# Patient Record
Sex: FEMALE | Race: WHITE | NOT HISPANIC OR LATINO | Employment: PART TIME | URBAN - METROPOLITAN AREA
[De-identification: names, ages, dates, MRNs, and addresses within clinical notes are randomized per-mention and may not be internally consistent; named-entity substitution may affect disease eponyms.]

---

## 2017-01-30 ENCOUNTER — APPOINTMENT (OUTPATIENT)
Dept: OCCUPATIONAL MEDICINE | Facility: CLINIC | Age: 22
End: 2017-01-30

## 2017-01-30 PROCEDURE — 99000 SPECIMEN HANDLING OFFICE-LAB: CPT | Performed by: PHYSICIAN ASSISTANT

## 2017-05-26 ENCOUNTER — CARE COORDINATION (OUTPATIENT)
Dept: CARE COORDINATION | Facility: CLINIC | Age: 22
End: 2017-05-26

## 2017-05-26 NOTE — PROGRESS NOTES
Clinic Care Coordination Contact    Situation: Patient chart reviewed by care coordinator.    Background: [facility] placed call to each phone number in pt's chart in an attempt to do a proactive IHP outreach.  None of the phone numbers allowed for this writer to leave a message.  SW to send letter of introduction to pt in today's mail.    Assessment: SW able to determine via chart review that pt had 2 ED visits due to cellulitis and 1 admission due to birth of child in the past year.    Plan/Recommendations: SW to try pt's phone numbers again next week.    Koki España  Social Work Care Coordinator  [facility]  [phone]

## 2017-05-26 NOTE — LETTER
[place] CARE COORDINATION  [address]  [place] [address]  [phone]      May 26, 2017      Nina Montes  [address]  [facility] [address]    Dear iNna,  I am the Clinic Care Coordinator that works with your primary care provider's clinic. I wanted to introduce myself and provide you with my contact information for you to be able to call me with any questions or concerns. Below is a description of what Clinic Care Coordination is and how I can further assist you.     The Clinic Care Coordinator role is a Registered Nurse and/or  who understands the health care system. The goal of Clinic Care Coordination is to help you manage your health and improve access to the [facility] system in the most efficient manner.  The Registered Nurse can assist you in meeting your health care goals by providing education, coordinating services, and strengthening the communication among your providers. The  can assist you with financial, behavioral, psychosocial, and chemical dependency and counseling/psychiatric resources.    Please feel free to keep this letter and contact me at [phone] with any further questions or concerns that may arise. We at [facility] are focused on providing you with the highest-quality healthcare experience possible and that all starts with you.       Sincerely,     Koki Cao

## 2017-05-26 NOTE — LETTER
Health Care Home - Access Care Plan    About Me  Patient Name:  Nina Montes    YOB: 1995  Age:                            21 year old   Geoff MRN:         [id number] Telephone Information:     Home Phone [phone]   Mobile [phone]       Address:    [address] [address] Email address:  [email or web address]      Emergency Contact(s)  Name Relationship Lgl Grd Work Phone Home Phone Mobile Phone   1. LIAM MONTES Mother   [phone]    2. DOROTHEA ALVES Other   [phone]              My Access Plan  Medical Emergency 916   Questions or concerns during clinic hours Primary Clinic Line, I will call the clinic directly:  [phone]   24 Hour Appointment Line [phone] or  [phone])  (toll free)   24 Hour Nurse Line [phone] (toll free)   Questions or concerns outside clinic hours 24 Hour Appointment Line, I will call the after-hours on-call line: [facility] [phone] or [phone]) (toll-free)   Preferred Urgent Care  [phone]   Preferred Hospital  [phone]   Preferred Pharmacy [facility] [address]     Behavioral Health Crisis Line Crisis Connection, [phone] or 911     My Care Team Members  Patient Care Team       Relationship Specialty Notifications Start End    Shamika Matias APRN CNP PCP - General Nurse Practitioner - Adult Health  4/22/15     Phone: [phone] Fax: [phone]         [facility] [address] [place] [address]    Koki Cao   Admissions 5/26/17     Phone: [phone] Fax: [phone]            My Medical and Care Information  Problem List   Patient Active Problem List   Diagnosis     Tobacco abuse     Reactive airway disease without complication     Nexplanon      Current Medications and Allergies:  See printed Medication Report

## 2017-06-01 ENCOUNTER — CARE COORDINATION (OUTPATIENT)
Dept: CARE COORDINATION | Facility: CLINIC | Age: 22
End: 2017-06-01

## 2017-06-01 NOTE — PROGRESS NOTES
Clinic Care Coordination Contact  [facility]/Voicemail    Referral Source: Pro-Active Outreach  Clinical Data: Care Coordinator Outreach  Outreach attempted x 2.  Left message on voicemail with call back information and requested return call.  Plan: Care Coordinator mailed out care coordination introduction letter on 5/26/17. Care Coordinator will do no further outreaches at this time.    Koki España  Social Work Care Coordinator  [facility]  [phone]

## 2017-09-27 ENCOUNTER — OFFICE VISIT (OUTPATIENT)
Dept: FAMILY MEDICINE | Facility: CLINIC | Age: 22
End: 2017-09-27
Payer: MEDICAID

## 2017-09-27 VITALS
HEART RATE: 140 BPM | BODY MASS INDEX: 32.95 KG/M2 | HEIGHT: 66 IN | DIASTOLIC BLOOD PRESSURE: 80 MMHG | WEIGHT: 205 LBS | OXYGEN SATURATION: 98 % | TEMPERATURE: 103.3 F | SYSTOLIC BLOOD PRESSURE: 149 MMHG

## 2017-09-27 DIAGNOSIS — R30.0 DYSURIA: ICD-10-CM

## 2017-09-27 DIAGNOSIS — N10 ACUTE PYELONEPHRITIS: Primary | ICD-10-CM

## 2017-09-27 DIAGNOSIS — R82.90 NONSPECIFIC FINDING ON EXAMINATION OF URINE: ICD-10-CM

## 2017-09-27 LAB
ALBUMIN UR-MCNC: NEGATIVE MG/DL
APPEARANCE UR: CLEAR
BACTERIA #/AREA URNS HPF: ABNORMAL /HPF
BILIRUB UR QL STRIP: NEGATIVE
COLOR UR AUTO: YELLOW
GLUCOSE UR STRIP-MCNC: NEGATIVE MG/DL
HGB UR QL STRIP: ABNORMAL
KETONES UR STRIP-MCNC: 15 MG/DL
LEUKOCYTE ESTERASE UR QL STRIP: ABNORMAL
NITRATE UR QL: NEGATIVE
PH UR STRIP: 5 PH (ref 5–7)
RBC #/AREA URNS AUTO: ABNORMAL /HPF
SOURCE: ABNORMAL
SP GR UR STRIP: <=1.005 (ref 1–1.03)
UROBILINOGEN UR STRIP-ACNC: 0.2 EU/DL (ref 0.2–1)
WBC #/AREA URNS AUTO: ABNORMAL /HPF

## 2017-09-27 PROCEDURE — 87088 URINE BACTERIA CULTURE: CPT | Performed by: INTERNAL MEDICINE

## 2017-09-27 PROCEDURE — 81001 URINALYSIS AUTO W/SCOPE: CPT | Performed by: INTERNAL MEDICINE

## 2017-09-27 PROCEDURE — 87186 SC STD MICRODIL/AGAR DIL: CPT | Performed by: INTERNAL MEDICINE

## 2017-09-27 PROCEDURE — 87086 URINE CULTURE/COLONY COUNT: CPT | Performed by: INTERNAL MEDICINE

## 2017-09-27 PROCEDURE — 99214 OFFICE O/P EST MOD 30 MIN: CPT | Performed by: INTERNAL MEDICINE

## 2017-09-27 RX ORDER — SULFAMETHOXAZOLE/TRIMETHOPRIM 800-160 MG
1 TABLET ORAL 2 TIMES DAILY
Qty: 14 TABLET | Refills: 0 | Status: SHIPPED | OUTPATIENT
Start: 2017-09-27 | End: 2018-09-25

## 2017-09-27 NOTE — NURSING NOTE
"Chief Complaint   Patient presents with     UTI     x 4 days, fever today 101.8       Initial /86 (BP Location: Right arm, Patient Position: Chair, Cuff Size: Adult Regular)  Pulse 140  Temp 103.3  F (39.6  C) (Tympanic)  Ht 5' 5.5\" (1.664 m)  Wt 205 lb (93 kg)  SpO2 98%  BMI 33.59 kg/m2 Estimated body mass index is 33.59 kg/(m^2) as calculated from the following:    Height as of this encounter: 5' 5.5\" (1.664 m).    Weight as of this encounter: 205 lb (93 kg).  Medication Reconciliation: complete   Leona HUNT CMA (AAMA)    "

## 2017-09-27 NOTE — PROGRESS NOTES
"  SUBJECTIVE:   Nina Montes is a 21 year old female who presents to clinic today for the following health issues:  Chief Complaint   Patient presents with     UTI     x 4 days, fever today 101.8     URINARY TRACT SYMPTOMS  Onset: x 4 days     Description:   Painful urination (Dysuria): YES- painful toward the end of urinating, feels pressure   Blood in urine (Hematuria): no   Delay in urine (Hesitency): no   Going frequently in small amounts    Intensity: moderate    Progression of Symptoms:  Worsening - noticing urine is now \"foamy\" as well     Accompanying Signs & Symptoms:  Fever/chills: YES- 101.8 at home and 103.3 in clinic   Flank pain YES- moderate pain on the left side. Difficulty moving, bending over  Nausea and vomiting: no   Any vaginal symptoms: none  Abdominal/Pelvic Pain: no     History:   History of frequent UTI's: no   History of kidney stones: no   Sexually Active: YES  Possibility of pregnancy: No    Precipitating factors:   None     Therapies Tried and outcome: Increase fluid intake and OTC advil or tylenol, generic Midol       Problem list and histories reviewed & adjusted, as indicated.  Additional history: as documented    Current Outpatient Prescriptions   Medication Sig Dispense Refill     sulfamethoxazole-trimethoprim (BACTRIM DS/SEPTRA DS) 800-160 MG per tablet Take 1 tablet by mouth 2 times daily 14 tablet 0     etonogestrel (IMPLANON/NEXPLANON) 68 MG IMPL 1 each (68 mg) by Subdermal route continuous  0     HYDROcodone-acetaminophen (NORCO) 5-325 MG per tablet Take 1-2 tablets by mouth every 4 hours as needed for moderate to severe pain 20 tablet 0     ibuprofen (ADVIL,MOTRIN) 400 MG tablet Take 1-2 tablets (400-800 mg) by mouth every 6 hours as needed for other (cramping) 40 tablet 0     No Known Allergies    Reviewed and updated as needed this visit by clinical staffTobacco  Allergies  Med Hx  Surg Hx  Fam Hx  Soc Hx      Reviewed and updated as needed this visit by Provider     " "    ROS:  Constitutional and gu systems are negative, except as otherwise noted.      OBJECTIVE:   /80 (BP Location: Right arm, Patient Position: Chair, Cuff Size: Adult Large)  Pulse 140  Temp 103.3  F (39.6  C) (Tympanic)  Ht 5' 5.5\" (1.664 m)  Wt 205 lb (93 kg)  SpO2 98%  BMI 33.59 kg/m2  Body mass index is 33.59 kg/(m^2).    GENERAL: healthy, alert and no distress  RESP: lungs clear to auscultation - no rales, rhonchi or wheezes  CV: regular rate and rhythm, normal S1 S2, no S3 or S4, no murmur, click or rub  ABDOMEN: soft, tender over the bladder, no hepatosplenomegaly, no masses and bowel sounds normal  BACK: no CVA tenderness    Diagnostic Test Results:  Results for orders placed or performed in visit on 09/27/17 (from the past 24 hour(s))   *UA reflex to Microscopic and Culture ([place] and [facility] (except [place] and [place])   Result Value Ref Range    Color Urine Yellow     Appearance Urine Clear     Glucose Urine Negative NEG^Negative mg/dL    Bilirubin Urine Negative NEG^Negative    Ketones Urine 15 (A) NEG^Negative mg/dL    Specific Gravity Urine <=1.005 1.003 - 1.035    Blood Urine Moderate (A) NEG^Negative    pH Urine 5.0 5.0 - 7.0 pH    Protein Albumin Urine Negative NEG^Negative mg/dL    Urobilinogen Urine 0.2 0.2 - 1.0 EU/dL    Nitrite Urine Negative NEG^Negative    Leukocyte Esterase Urine Moderate (A) NEG^Negative    Source Midstream Urine    Urine Microscopic   Result Value Ref Range    WBC Urine 25-50 (A) OTO2^O - 2 /HPF    RBC Urine O - 2 OTO2^O - 2 /HPF    Bacteria Urine Moderate (A) NEG^Negative /HPF       ASSESSMENT/PLAN:       1. Acute pyelonephritis    Nina has symptoms and U/A consistent with a UTI, and I'm concerned that this could be pyelonephritis since she quite febrile and tachycardic and is having left flank pain (though no CVA tenderness on exam).  We did discuss possible hospitalization for closer monitoring given her tachycardia, but she was not too " enthusiastic about this and since she is young and otherwise healthy, she should hopefully be okay with outpatient management.   However, I strongly advised her to have a low threshold to present to the ER if anything worsens and to keep up with hydration.  Will treat with at least 7 days of Bactrim pending culture results, extend if not entirely improving.      - sulfamethoxazole-trimethoprim (BACTRIM DS/SEPTRA DS) 800-160 MG per tablet; Take 1 tablet by mouth 2 times daily  Dispense: 14 tablet; Refill: 0    2. Dysuria    - *UA reflex to Microscopic and Culture ([place] [facility] (except [place] and [place])  - Urine Microscopic  - Urine Culture Aerobic Bacterial    3. Nonspecific finding on examination of urine    - Urine Culture Aerobic Bacterial    Ayad Arrington MD  [facility]

## 2017-09-27 NOTE — MR AVS SNAPSHOT
"              After Visit Summary   9/27/2017    Nina Montes    MRN: [id number]           Patient Information     Date Of Birth          1995        Visit Information        Provider Department      9/27/2017 4:20 PM Ayad Arrington MD [facility]        Today's Diagnoses     Dysuria    -  1    Nonspecific finding on examination of urine        Acute pyelonephritis          Care Instructions      Kidney Infection (Adult, Female)    An infection in one or both kidneys is called \"pyelonephritis.\" It usually happens when bacteria (or rarely, viruses, fungi, or other disease-causing organisms) get into the kidney. The bacteria (or other disease-causing organisms) can enter the kidneys from the bladder or blood traveling from other parts of the body. A kidney infection can become serious. It can cause severe illness, scarring of the kidneys, or kidney failure if not treated properly.  Common causes for this problem include:    Not keeping the genital area clean and dry, which promotes the growth of bacteria    Wiping back to front which drags bacteria from the rectum toward the urinary opening (urethra)    Wearing tight pants or underwear (this lets moisture build up in the genital area, which helps bacteria grow)    Holding urine in for long periods of time    Dehydration  Kidney infections can cause symptoms similar to a bladder infection. Symptoms include:    Pain (or burning) when urinating    Having to urinate more often than usual    Blood in the urine (pink or red)    Abdominal pain or discomfort, usually in the lower abdomen    Pain in the side or back    Pain above the pubic bone    Fever or chills    Vomiting    Loss of appetite  Treatment is oral antibiotics, or in more severe cases, intramuscular or IV antibiotics. These are started right away and may be changed once urine culture results determine the infecting organisms. Treatment helps prevent a more serious kidney " infection.  Medicines  Medicines can help in the treatment of a bladder infection:    Take antibiotics until they are used up, even if you feel better. It is important to finish them to make sure the infection is gone.    Unless another medicine was prescribed, you can use over-the-counter medicines for pain, fever, or discomfort. If you have chronic liver of kidney disease, talk with your healthcare provider before using these medicines. Also talk with your provider if you've ever had a stomach ulcer or gastrointestinal (GI) bleeding, or are taking blood thinners.  Home care  The following are general care guidelines:    Stay home from work or school. Rest in bed until your fever breaks and you are feeling better, or as advised by your healthcare provider.    Drink lots of fluid unless you must restrict fluids for other medical reasons. This will force the medicine into your urinary system and flush the bacteria out of your body. Ask your healthcare provider how much you should drink.    Avoid sex until you have finished all of your medicine and your symptoms are gone.    Avoid caffeine, alcohol, and spicy foods. These foods may irritate the kidneys and bladder.    Avoid taking bubble baths. Sensitivity to the chemicals in bubble baths can irritate the urethra.    Make sure you wipe from front to back after using the toilet.    Wear loose cloths and cotton underwear.  Prevention  These self-care steps can help prevent future infections:    Drink plenty of fluids to prevent dehydration and flush out the bladder. Do this unless you must restrict fluids for other health reasons, or your healthcare provider told you not to.    Proper cleaning after going to the bathroom in important. Make sure you wipe from front to back after using the toilet.    Urinate more often. Don't try to hold urine in for a long time.    Avoid tight-fitting pants and underwear.    Improve your diet to prevent constipation. Eat more fruits,  vegetables, and fiber. Eat less junk and fatty foods. Constipation can make a urinary tract infection more likely. Talk with your healthcare provider if you have trouble with bowel movements.    Urinate right after intercourse to flush out the bladder.  Follow-up care  Follow up with your healthcare provider, or as advised. Additional testing may be needed to make sure the infection has cleared. Close follow-up and further testing is very important to find the cause and to prevent future infections.  If a urine culture was done, you will be contacted if your treatment needs to be changed. If directed, you may call to find out the results.  If you had an X-ray, CT scan, or other diagnostic test, you will be notified of any new findings that may affect your care.  Call 911  Call 911 if any of the following occur:    Trouble breathing    Fainting or loss of consciousness    Rapid or very slow heart rate    Weakness, dizziness, or fainting    Difficulty arousing or confusion  When to seek medical advice  Call your healthcare provider right away if any of these occur:    Fever 100.4 F (38 C) or higher after 48 hours of treatment, or as advised by your healthcare provider    Not feeling better within 1 to 2 days after starting antibiotics    Any symptom that continues after 3 days of treatment    Increasing pain in the stomach, back, side, or groin area    Repeated vomiting    Not able to take prescribed medicine due to nausea or another reason    Bloody, dark-colored, or foul smelling urine    Trouble urinating or decreased urine output    No urine for 8 hours, no tears when crying, sunken eyes, or dry mouth  Date Last Reviewed: 10/1/2016    7324-3429 [employer]. [address] [place] [address], [place] [address]. All rights reserved. This information is not intended as a substitute for professional medical care. Always follow your healthcare professional's instructions.                Follow-ups after your visit    "     Who to contact     If you have questions or need follow up information about today's clinic visit or your schedule please contact [facility] directly at [phone].  Normal or non-critical lab and imaging results will be communicated to you by MyChart, letter or phone within 4 business days after the clinic has received the results. If you do not hear from us within 7 days, please contact the clinic through [email or web address] or phone. If you have a critical or abnormal lab result, we will notify you by phone as soon as possible.  Submit refill requests through [employer] or call your pharmacy and they will forward the refill request to us. Please allow 3 business days for your refill to be completed.          Additional Information About Your Visit        MyQuoteApp Information     [employer] gives you secure access to your electronic health record. If you see a primary care provider, you can also send messages to your care team and make appointments. If you have questions, please call your primary care clinic.  If you do not have a primary care provider, please call [phone] and they will assist you.        Care EveryWhere ID     This is your Care EveryWhere ID. This could be used by other organizations to access your [place] medical records  [id number]        Your Vitals Were     Pulse Temperature Height Pulse Oximetry BMI (Body Mass Index)       140 103.3  F (39.6  C) (Tympanic) 5' 5.5\" (1.664 m) 98% 33.59 kg/m2        Blood Pressure from Last 3 Encounters:   09/27/17 149/80   10/28/16 115/73   09/18/16 154/86    Weight from Last 3 Encounters:   09/27/17 205 lb (93 kg)   10/28/16 196 lb 3.2 oz (89 kg) (97 %)*   09/14/16 207 lb (93.9 kg) (98 %)*     * Growth percentiles are based on CDC 2-20 Years data.              We Performed the Following     *UA reflex to Microscopic and Culture ([place] and [facility] (except [place] and [place])     Urine Culture Aerobic Bacterial     Urine Microscopic     "      Today's Medication Changes          These changes are accurate as of: 9/27/17  4:45 PM.  If you have any questions, ask your nurse or doctor.               Start taking these medicines.        Dose/Directions    sulfamethoxazole-trimethoprim 800-160 MG per tablet   Commonly known as:  BACTRIM DS/SEPTRA DS   Used for:  Acute pyelonephritis   Started by:  Ayad Arrington MD        Dose:  1 tablet   Take 1 tablet by mouth 2 times daily   Quantity:  14 tablet   Refills:  0            Where to get your medicines      These medications were sent to [facility] [address]  [address] [address]     Phone:  [phone]     sulfamethoxazole-trimethoprim 800-160 MG per tablet                Primary Care Provider Office Phone # Fax #    Shamika Tilley SHANNAN Matias [phone]       [address] [address]        Equal Access to Services     STEVE VALENZUELA : Hadii kelechi ku hadasho Sorickyali, waaxda luqadaha, qaybta kaalmada adeegyada, waxay dontain hayomar pacheco . So [facility] [phone].    ATENCIÓN: Si habla español, tiene a guerrier disposición servicios gratuitos de asistencia lingüística. Abrahamame al [phone].    We comply with applicable federal civil rights laws and [place] laws. We do not discriminate on the basis of race, color, national origin, age, disability sex, sexual orientation or gender identity.            Thank you!     Thank you for choosing [facility]  for your care. Our goal is always to provide you with excellent care. Hearing back from our patients is one way we can continue to improve our services. Please take a few minutes to complete the written survey that you may receive in the mail after your visit with us. Thank you!             Your Updated Medication List - Protect others around you: Learn how to safely use, store and throw away your medicines at www.disposemymeds.org.          This list is  accurate as of: 17  4:45 PM.  Always use your most recent med list.                   Brand Name Dispense Instructions for use Diagnosis    etonogestrel 68 MG Impl    IMPLANON/NEXPLANON     1 each (68 mg) by Subdermal route continuous    Nexplanon insertion       HYDROcodone-acetaminophen 5-325 MG per tablet    NORCO    20 tablet    Take 1-2 tablets by mouth every 4 hours as needed for moderate to severe pain     (normal spontaneous vaginal delivery)       ibuprofen 400 MG tablet    ADVIL/MOTRIN    40 tablet    Take 1-2 tablets (400-800 mg) by mouth every 6 hours as needed for other (cramping)     (normal spontaneous vaginal delivery)       sulfamethoxazole-trimethoprim 800-160 MG per tablet    BACTRIM DS/SEPTRA DS    14 tablet    Take 1 tablet by mouth 2 times daily    Acute pyelonephritis

## 2017-09-27 NOTE — PATIENT INSTRUCTIONS
"  Kidney Infection (Adult, Female)    An infection in one or both kidneys is called \"pyelonephritis.\" It usually happens when bacteria (or rarely, viruses, fungi, or other disease-causing organisms) get into the kidney. The bacteria (or other disease-causing organisms) can enter the kidneys from the bladder or blood traveling from other parts of the body. A kidney infection can become serious. It can cause severe illness, scarring of the kidneys, or kidney failure if not treated properly.  Common causes for this problem include:    Not keeping the genital area clean and dry, which promotes the growth of bacteria    Wiping back to front which drags bacteria from the rectum toward the urinary opening (urethra)    Wearing tight pants or underwear (this lets moisture build up in the genital area, which helps bacteria grow)    Holding urine in for long periods of time    Dehydration  Kidney infections can cause symptoms similar to a bladder infection. Symptoms include:    Pain (or burning) when urinating    Having to urinate more often than usual    Blood in the urine (pink or red)    Abdominal pain or discomfort, usually in the lower abdomen    Pain in the side or back    Pain above the pubic bone    Fever or chills    Vomiting    Loss of appetite  Treatment is oral antibiotics, or in more severe cases, intramuscular or IV antibiotics. These are started right away and may be changed once urine culture results determine the infecting organisms. Treatment helps prevent a more serious kidney infection.  Medicines  Medicines can help in the treatment of a bladder infection:    Take antibiotics until they are used up, even if you feel better. It is important to finish them to make sure the infection is gone.    Unless another medicine was prescribed, you can use over-the-counter medicines for pain, fever, or discomfort. If you have chronic liver of kidney disease, talk with your healthcare provider before using these " medicines. Also talk with your provider if you've ever had a stomach ulcer or gastrointestinal (GI) bleeding, or are taking blood thinners.  Home care  The following are general care guidelines:    Stay home from work or school. Rest in bed until your fever breaks and you are feeling better, or as advised by your healthcare provider.    Drink lots of fluid unless you must restrict fluids for other medical reasons. This will force the medicine into your urinary system and flush the bacteria out of your body. Ask your healthcare provider how much you should drink.    Avoid sex until you have finished all of your medicine and your symptoms are gone.    Avoid caffeine, alcohol, and spicy foods. These foods may irritate the kidneys and bladder.    Avoid taking bubble baths. Sensitivity to the chemicals in bubble baths can irritate the urethra.    Make sure you wipe from front to back after using the toilet.    Wear loose cloths and cotton underwear.  Prevention  These self-care steps can help prevent future infections:    Drink plenty of fluids to prevent dehydration and flush out the bladder. Do this unless you must restrict fluids for other health reasons, or your healthcare provider told you not to.    Proper cleaning after going to the bathroom in important. Make sure you wipe from front to back after using the toilet.    Urinate more often. Don't try to hold urine in for a long time.    Avoid tight-fitting pants and underwear.    Improve your diet to prevent constipation. Eat more fruits, vegetables, and fiber. Eat less junk and fatty foods. Constipation can make a urinary tract infection more likely. Talk with your healthcare provider if you have trouble with bowel movements.    Urinate right after intercourse to flush out the bladder.  Follow-up care  Follow up with your healthcare provider, or as advised. Additional testing may be needed to make sure the infection has cleared. Close follow-up and further testing  is very important to find the cause and to prevent future infections.  If a urine culture was done, you will be contacted if your treatment needs to be changed. If directed, you may call to find out the results.  If you had an X-ray, CT scan, or other diagnostic test, you will be notified of any new findings that may affect your care.  Call 911  Call 911 if any of the following occur:    Trouble breathing    Fainting or loss of consciousness    Rapid or very slow heart rate    Weakness, dizziness, or fainting    Difficulty arousing or confusion  When to seek medical advice  Call your healthcare provider right away if any of these occur:    Fever 100.4 F (38 C) or higher after 48 hours of treatment, or as advised by your healthcare provider    Not feeling better within 1 to 2 days after starting antibiotics    Any symptom that continues after 3 days of treatment    Increasing pain in the stomach, back, side, or groin area    Repeated vomiting    Not able to take prescribed medicine due to nausea or another reason    Bloody, dark-colored, or foul smelling urine    Trouble urinating or decreased urine output    No urine for 8 hours, no tears when crying, sunken eyes, or dry mouth  Date Last Reviewed: 10/1/2016    8631-0908 [employer]. [address] [place] [address], [place] [address]. All rights reserved. This information is not intended as a substitute for professional medical care. Always follow your healthcare professional's instructions.

## 2017-09-28 LAB
BACTERIA SPEC CULT: ABNORMAL
Lab: ABNORMAL
SPECIMEN SOURCE: ABNORMAL

## 2018-03-03 ENCOUNTER — HEALTH MAINTENANCE LETTER (OUTPATIENT)
Age: 23
End: 2018-03-03

## 2018-09-25 ENCOUNTER — OFFICE VISIT (OUTPATIENT)
Dept: OBGYN | Facility: CLINIC | Age: 23
End: 2018-09-25
Payer: COMMERCIAL

## 2018-09-25 ENCOUNTER — OFFICE VISIT (OUTPATIENT)
Dept: FAMILY MEDICINE | Facility: CLINIC | Age: 23
End: 2018-09-25
Payer: COMMERCIAL

## 2018-09-25 VITALS
BODY MASS INDEX: 32.65 KG/M2 | SYSTOLIC BLOOD PRESSURE: 116 MMHG | WEIGHT: 196 LBS | DIASTOLIC BLOOD PRESSURE: 72 MMHG | HEIGHT: 65 IN | HEART RATE: 95 BPM | RESPIRATION RATE: 18 BRPM | OXYGEN SATURATION: 99 % | TEMPERATURE: 98.6 F

## 2018-09-25 VITALS
WEIGHT: 196 LBS | HEART RATE: 81 BPM | SYSTOLIC BLOOD PRESSURE: 120 MMHG | DIASTOLIC BLOOD PRESSURE: 75 MMHG | TEMPERATURE: 99.1 F | BODY MASS INDEX: 32.37 KG/M2

## 2018-09-25 DIAGNOSIS — Z11.3 SCREENING FOR STD (SEXUALLY TRANSMITTED DISEASE): Primary | ICD-10-CM

## 2018-09-25 DIAGNOSIS — Z12.4 SCREENING FOR CERVICAL CANCER: ICD-10-CM

## 2018-09-25 DIAGNOSIS — Z30.46 NEXPLANON REMOVAL: Primary | ICD-10-CM

## 2018-09-25 DIAGNOSIS — Z30.011 ENCOUNTER FOR INITIAL PRESCRIPTION OF CONTRACEPTIVE PILLS: ICD-10-CM

## 2018-09-25 PROCEDURE — 99213 OFFICE O/P EST LOW 20 MIN: CPT | Performed by: FAMILY MEDICINE

## 2018-09-25 PROCEDURE — 99212 OFFICE O/P EST SF 10 MIN: CPT | Mod: 25 | Performed by: OBSTETRICS & GYNECOLOGY

## 2018-09-25 PROCEDURE — 11982 REMOVE DRUG IMPLANT DEVICE: CPT | Performed by: OBSTETRICS & GYNECOLOGY

## 2018-09-25 PROCEDURE — G0145 SCR C/V CYTO,THINLAYER,RESCR: HCPCS | Performed by: FAMILY MEDICINE

## 2018-09-25 PROCEDURE — 87491 CHLMYD TRACH DNA AMP PROBE: CPT | Performed by: FAMILY MEDICINE

## 2018-09-25 RX ORDER — LEVONORGESTREL/ETHIN.ESTRADIOL 0.1-0.02MG
1 TABLET ORAL DAILY
Qty: 84 TABLET | Refills: 3 | Status: SHIPPED | OUTPATIENT
Start: 2018-09-25 | End: 2019-10-09

## 2018-09-25 ASSESSMENT — ANXIETY QUESTIONNAIRES
3. WORRYING TOO MUCH ABOUT DIFFERENT THINGS: NOT AT ALL
2. NOT BEING ABLE TO STOP OR CONTROL WORRYING: NOT AT ALL
5. BEING SO RESTLESS THAT IT IS HARD TO SIT STILL: NOT AT ALL
6. BECOMING EASILY ANNOYED OR IRRITABLE: NOT AT ALL
GAD7 TOTAL SCORE: 0
7. FEELING AFRAID AS IF SOMETHING AWFUL MIGHT HAPPEN: NOT AT ALL
1. FEELING NERVOUS, ANXIOUS, OR ON EDGE: NOT AT ALL

## 2018-09-25 ASSESSMENT — PATIENT HEALTH QUESTIONNAIRE - PHQ9: 5. POOR APPETITE OR OVEREATING: NOT AT ALL

## 2018-09-25 NOTE — MR AVS SNAPSHOT
After Visit Summary   9/25/2018    Nina Montes    MRN: [id number]           Patient Information     Date Of Birth          1995        Visit Information        Provider Department      9/25/2018 1:20 PM Penny Martinez MD [facility]        Today's Diagnoses     Screening for STD (sexually transmitted disease)    -  1    Screening for cervical cancer          Care Instructions          Thank you for choosing [facility].  You may be receiving a survey in the mail from [facility] regarding your visit today.  Please take a few minutes to complete and return the survey to let us know how we are doing.      If you have questions or concerns, please contact us via [employer] or you can contact your care team at [phone].    Our Clinic hours are:  Monday 6:40 am  to 7:00 pm  Tuesday -Friday 6:40 am to 5:00 pm    The [place] outpatient lab hours are:  Monday - Friday 6:10 am to 4:45 pm  Saturdays 7:00 am to 11:00 am  Appointments are required, call [phone]    If you have clinical questions after hours or would like to schedule an appointment,  call the clinic at [phone].          Follow-ups after your visit        Who to contact     If you have questions or need follow up information about today's clinic visit or your schedule please contact [facility] directly at [phone].  Normal or non-critical lab and imaging results will be communicated to you by MyChart, letter or phone within 4 business days after the clinic has received the results. If you do not hear from us within 7 days, please contact the clinic through [employer] or phone. If you have a critical or abnormal lab result, we will notify you by phone as soon as possible.  Submit refill requests through [employer] or call your pharmacy and they will forward the refill request to us. Please allow 3 business days for your refill to be completed.          Additional Information About Your  "Visit        [employer] Information     [employer] gives you secure access to your electronic health record. If you see a primary care provider, you can also send messages to your care team and make appointments. If you have questions, please call your primary care clinic.  If you do not have a primary care provider, please call [phone] and they will assist you.        Care EveryWhere ID     This is your Care EveryWhere ID. This could be used by other organizations to access your [place] medical records  [id number]        Your Vitals Were     Pulse Temperature Respirations Height Last Period Pulse Oximetry    95 98.6  F (37  C) (Tympanic) 18 5' 5.25\" (1.657 m) 09/05/2018 (Approximate) 99%    BMI (Body Mass Index)                   32.37 kg/m2            Blood Pressure from Last 3 Encounters:   09/25/18 120/75   09/25/18 116/72   09/27/17 149/80    Weight from Last 3 Encounters:   09/25/18 196 lb (88.9 kg)   09/25/18 196 lb (88.9 kg)   09/27/17 205 lb (93 kg)              We Performed the Following     Chlamydia trachomatis PCR     DEPRESSION ACTION PLAN (DAP)     Pap imaged thin layer screen only - recommended age 21 - 24 years          Today's Medication Changes          These changes are accurate as of 9/25/18 11:59 PM.  If you have any questions, ask your nurse or doctor.               Start taking these medicines.        Dose/Directions    levonorgestrel-ethinyl estradiol 0.1-20 MG-MCG per tablet   Commonly known as:  VICKEY VEE LESSINA   Used for:  Encounter for initial prescription of contraceptive pills   Started by:  Lucius Longoria MD        Dose:  1 tablet   Take 1 tablet by mouth daily   Quantity:  84 tablet   Refills:  3         Stop taking these medicines if you haven't already. Please contact your care team if you have questions.     sulfamethoxazole-trimethoprim 800-160 MG per tablet   Commonly known as:  BACTRIM DS/SEPTRA DS   Stopped by:  Penny Martinez MD              "   Where to get your medicines      These medications [place] [facility] [place] [address]  [address] [address]     Phone:  [phone]     levonorgestrel-ethinyl estradiol 0.1-20 MG-MCG per tablet                Primary Care Provider Office Phone # Fax SHANNAN Valladares -[phone]       [address] [facility] [address]        Equal Access to Services     STEVE VALENZUELA : Hadii aad ku hadasho Soomaali, waaxda luqadaha, qaybta kaalmada adeegyada, waxay idiin haybeatrizn john pacheco . So [facility] [phone].    ATENCIÓN: Si habla español, tiene a guerrier disposición servicios gratuitos de asistencia lingüística. [facility] [phone].    We comply with applicable federal civil rights laws and [place] laws. We do not discriminate on the basis of race, color, national origin, age, disability, sex, sexual orientation, or gender identity.            Thank you!     Thank you for choosing [facility]  for your care. Our goal is always to provide you with excellent care. Hearing back from our patients is one way we can continue to improve our services. Please take a few minutes to complete the written survey that you may receive in the mail after your visit with us. Thank you!             Your Updated Medication List - Protect others around you: Learn how to safely use, store and throw away your medicines at www.disposemymeds.org.          This list is accurate as of 18 11:59 PM.  Always use your most recent med list.                   Brand Name Dispense Instructions for use Diagnosis    ibuprofen 400 MG tablet    ADVIL/MOTRIN    40 tablet    Take 1-2 tablets (400-800 mg) by mouth every 6 hours as needed for other (cramping)     (normal spontaneous vaginal delivery)       levonorgestrel-ethinyl estradiol 0.1-20 MG-MCG per tablet    AVIANE,ALESSE,LESSINA    84 tablet    Take 1 tablet by mouth daily    Encounter for  initial prescription of contraceptive pills

## 2018-09-25 NOTE — PROGRESS NOTES
Nexplanon Removal    Ms. Nina Montes is a 22 year old P1 who presents to have her Nexplanon removed. Had it placed 2 years ago. Does not tolerate breakthrough bleeding and cramping. Prior other contraceptive methods have been OCPs.  Would prefer to return OCPs.      /75 (BP Location: Left arm, Patient Position: Chair, Cuff Size: Adult Regular)  Pulse 81  Temp 99.1  F (37.3  C) (Tympanic)  Wt 196 lb (88.9 kg)  LMP 09/05/2018 (Approximate)  Breastfeeding? No  BMI 32.37 kg/m2  General Appearance: NAD    Nexplanon Removal  Technique: Patient left arm was positioned and the Nexplanon by palpation. A vilma was made at the end of the Nexplanon closest to the elbow. Site of incision was anesthetized 3 mL of 1% lidocaine without epinephrine. A 3 mm incision was made in a longitudinal direction of the arm at the tip of the implant closest to the elbow.Nexplanon was pushed gently towards the incision until the tip is visible. The implant was grasped with curved mosquito forceps and pulled out gently. Incison was bandaged and a wrap placed around the arm for hemostasis. Patient tolerated the procedure well.     Assessment/Plan:   1) Nexplanon Removal  -- precautions given regarding wound healing (echymosis, erythema, purulent discharge, fevers) to return to clinic     2) Initial prescription for contraception  -- low dose combined OCPs prescribed; side effect profile reviewed    Lucius Longoria MD  [facility]

## 2018-09-25 NOTE — PATIENT INSTRUCTIONS
Thank you for choosing [facility].  You may be receiving a survey in the mail from Liseth Eli regarding your visit today.  Please take a few minutes to complete and return the survey to let us know how we are doing.      If you have questions or concerns, please contact us via [employer] or you can contact your care team at [phone].    Our Clinic hours are:  Monday 6:40 am  to 7:00 pm  Tuesday -Friday 6:40 am to 5:00 pm    The [place] outpatient lab hours are:  Monday - Friday 6:10 am to 4:45 pm  Saturdays 7:00 am to 11:00 am  Appointments are required, call [phone]    If you have clinical questions after hours or would like to schedule an appointment,  call the clinic at [phone].

## 2018-09-25 NOTE — MR AVS SNAPSHOT
After Visit Summary   9/25/2018    Nina Montes    MRN: [id number]           Patient Information     Date Of Birth          1995        Visit Information        Provider Department      9/25/2018 2:00 PM Lucius Longoria MD [facility]        Today's Diagnoses     Nexplanon removal    -  1    Encounter for initial prescription of contraceptive pills           Follow-ups after your visit        Who to contact     If you have questions or need follow up information about today's clinic visit or your schedule please contact [facility] directly at [phone].  Normal or non-critical lab and imaging results will be communicated to you by MyChart, letter or phone within 4 business days after the clinic has received the results. If you do not hear from us within 7 days, please contact the clinic through [employer] or phone. If you have a critical or abnormal lab result, we will notify you by phone as soon as possible.  Submit refill requests through [employer] or call your pharmacy and they will forward the refill request to us. Please allow 3 business days for your refill to be completed.          Additional Information About Your Visit        MyChart Information     [employer] gives you secure access to your electronic health record. If you see a primary care provider, you can also send messages to your care team and make appointments. If you have questions, please call your primary care clinic.  If you do not have a primary care provider, please call [phone] and they will assist you.        Care EveryWhere ID     This is your Care EveryWhere ID. This could be used by other organizations to access your [place] medical records  [id number]        Your Vitals Were     Pulse Temperature Last Period Breastfeeding? BMI (Body Mass Index)       81 99.1  F (37.3  C) (Tympanic) 09/05/2018 (Approximate) No 32.37 kg/m2        Blood Pressure from Last 3 Encounters:    09/25/18 120/75   09/25/18 116/72   09/27/17 149/80    Weight from Last 3 Encounters:   09/25/18 196 lb (88.9 kg)   09/25/18 196 lb (88.9 kg)   09/27/17 205 lb (93 kg)              We Performed the Following     REMOVAL NEXPLANON          Today's Medication Changes          These changes are accurate as of 9/25/18  2:34 PM.  If you have any questions, ask your nurse or doctor.               Start taking these medicines.        Dose/Directions    levonorgestrel-ethinyl estradiol 0.1-20 MG-MCG per tablet   Commonly known as:  VICKEY VEE LESSINA   Used for:  Encounter for initial prescription of contraceptive pills   Started by:  Lucius Longoria MD        Dose:  1 tablet   Take 1 tablet by mouth daily   Quantity:  84 tablet   Refills:  3         Stop taking these medicines if you haven't already. Please contact your care team if you have questions.     sulfamethoxazole-trimethoprim 800-160 MG per tablet   Commonly known as:  BACTRIM DS/SEPTRA DS   Stopped by:  Penny Martinez MD                Where to get your medicines      These medications were sent to [facility] [address]  [address] [address]     Phone:  [phone]     levonorgestrel-ethinyl estradiol 0.1-20 MG-MCG per tablet                Primary Care Provider Office Phone # Fax #    Shamika Tilley Florencio, SHANNAN [facility] [phone] [address] [facility] [address] [address]        Equal Access to Services     SINA VALENZUELA : Hadosmel ca Sosabas, waaxda luqadaha, qaybta kaalmada tanisha, elmo cortes. So [facility] [phone].    ATENCIÓN: Si habla español, tiene a guerrier disposición servicios gratuitos de asistencia lingüística. Karen al [phone].    We comply with applicable federal civil rights laws and [place] laws. We do not discriminate on the basis of race, color, national origin, age, disability, sex, sexual orientation, or  gender identity.            Thank you!     Thank you for choosing [facility]  for your care. Our goal is always to provide you with excellent care. Hearing back from our patients is one way we can continue to improve our services. Please take a few minutes to complete the written survey that you may receive in the mail after your visit with us. Thank you!             Your Updated Medication List - Protect others around you: Learn how to safely use, store and throw away your medicines at www.disposemymeds.org.          This list is accurate as of 18  2:34 PM.  Always use your most recent med list.                   Brand Name Dispense Instructions for use Diagnosis    ibuprofen 400 MG tablet    ADVIL/MOTRIN    40 tablet    Take 1-2 tablets (400-800 mg) by mouth every 6 hours as needed for other (cramping)     (normal spontaneous vaginal delivery)       levonorgestrel-ethinyl estradiol 0.1-20 MG-MCG per tablet    AVIANE,ALESSE,LESSINA    84 tablet    Take 1 tablet by mouth daily    Encounter for initial prescription of contraceptive pills

## 2018-09-26 LAB
C TRACH DNA SPEC QL NAA+PROBE: NEGATIVE
SPECIMEN SOURCE: NORMAL

## 2018-09-26 ASSESSMENT — PATIENT HEALTH QUESTIONNAIRE - PHQ9: SUM OF ALL RESPONSES TO PHQ QUESTIONS 1-9: 0

## 2018-09-26 ASSESSMENT — ANXIETY QUESTIONNAIRES: GAD7 TOTAL SCORE: 0

## 2018-09-27 LAB
COPATH REPORT: NORMAL
PAP: NORMAL

## 2018-09-27 NOTE — PROGRESS NOTES
Please inform patient that test result was within normal parameters.   Thank you.     Penny Martinez M.D.

## 2019-06-27 ENCOUNTER — TELEPHONE (OUTPATIENT)
Dept: FAMILY MEDICINE | Facility: CLINIC | Age: 24
End: 2019-06-27

## 2019-06-27 NOTE — TELEPHONE ENCOUNTER
Panel Management Review      Patient has the following on her problem list:   Asthma review   No flowsheet data found.   1. Is Asthma diagnosis on the Problem List? Yes    2. Is Asthma listed on Health Maintenance? Yes    3. Patient is due for:  ACT      Composite cancer screening  Chart review shows that this patient is due/due soon for the following None  Summary:    Patient is due/failing the following:   ACT    Action needed:   Patient needs to do ACT.    Type of outreach:    Letter mailed with ACT for the patient to complete and return.    Questions for provider review:    None                                                                                                                                    LINDA Chen MA

## 2019-06-27 NOTE — LETTER
June 27, 2019      Nina Montes  [address]    [facility] [address]        Dear Nina,     Your [facility] Team works hard to make sure that you and your family receive exceptional care. Enclosed you will find a copy of the Asthma Control Test (ACT) that our clinic uses to monitor and manage your asthma. This test is an assessment tool that we use to determine how well your asthma is controlled.  Please complete the enclosed form and return in the provided envelope.  Thank you for trusting us with your health care.    Sincerely,        Your [facility] Team/weston

## 2019-10-09 ENCOUNTER — MYC REFILL (OUTPATIENT)
Dept: OBGYN | Facility: CLINIC | Age: 24
End: 2019-10-09

## 2019-10-09 DIAGNOSIS — Z30.011 ENCOUNTER FOR INITIAL PRESCRIPTION OF CONTRACEPTIVE PILLS: ICD-10-CM

## 2019-10-09 RX ORDER — LEVONORGESTREL/ETHIN.ESTRADIOL 0.1-0.02MG
1 TABLET ORAL DAILY
Qty: 84 TABLET | Refills: 0 | Status: SHIPPED | OUTPATIENT
Start: 2019-10-09 | End: 2020-06-15

## 2019-10-09 NOTE — LETTER
[facility] [place]  [address]  [facility] [address]  Phone: [phone]    2019    Nina Montes                                                                                                                  [address] /  [facility] [address]            Dear Ms. Montes,    We are concerned about your health care.  We recently provided you with a medication refill.  Many medications require routine follow-up with your Doctor.      At this time we ask that: You schedule an appointment for your annual physical as it has been over 1 year since you were last seen on 2018.    Your prescription: Is  and has been refilled once so you may have time for the above noted follow-up. Please be seen prior to needing your next refill of medication.     You need to be seen annually to renew a prescription.    Thank you,      Lucius Longoria MD/ [facility]

## 2019-11-06 ENCOUNTER — HEALTH MAINTENANCE LETTER (OUTPATIENT)
Age: 24
End: 2019-11-06

## 2019-12-02 ENCOUNTER — APPOINTMENT (OUTPATIENT)
Dept: GENERAL RADIOLOGY | Facility: CLINIC | Age: 24
End: 2019-12-02
Attending: PHYSICIAN ASSISTANT
Payer: COMMERCIAL

## 2019-12-02 ENCOUNTER — HOSPITAL ENCOUNTER (EMERGENCY)
Facility: CLINIC | Age: 24
Discharge: HOME | End: 2019-12-02
Attending: PHYSICIAN ASSISTANT | Admitting: PHYSICIAN ASSISTANT
Payer: COMMERCIAL

## 2019-12-02 VITALS
TEMPERATURE: 98.4 F | OXYGEN SATURATION: 98 % | HEIGHT: 65 IN | BODY MASS INDEX: 31.65 KG/M2 | DIASTOLIC BLOOD PRESSURE: 92 MMHG | HEART RATE: 97 BPM | RESPIRATION RATE: 16 BRPM | SYSTOLIC BLOOD PRESSURE: 156 MMHG | WEIGHT: 190 LBS

## 2019-12-02 DIAGNOSIS — Z72.0 TOBACCO ABUSE: ICD-10-CM

## 2019-12-02 DIAGNOSIS — R05.9 COUGH: ICD-10-CM

## 2019-12-02 PROCEDURE — 99214 OFFICE O/P EST MOD 30 MIN: CPT | Mod: Z6 | Performed by: PHYSICIAN ASSISTANT

## 2019-12-02 PROCEDURE — G0463 HOSPITAL OUTPT CLINIC VISIT: HCPCS | Mod: 25 | Performed by: PHYSICIAN ASSISTANT

## 2019-12-02 PROCEDURE — 71046 X-RAY EXAM CHEST 2 VIEWS: CPT

## 2019-12-02 PROCEDURE — 25000125 ZZHC RX 250: Performed by: PHYSICIAN ASSISTANT

## 2019-12-02 PROCEDURE — 94640 AIRWAY INHALATION TREATMENT: CPT | Performed by: PHYSICIAN ASSISTANT

## 2019-12-02 RX ORDER — IPRATROPIUM BROMIDE AND ALBUTEROL SULFATE 2.5; .5 MG/3ML; MG/3ML
3 SOLUTION RESPIRATORY (INHALATION) ONCE
Status: COMPLETED | OUTPATIENT
Start: 2019-12-02 | End: 2019-12-02

## 2019-12-02 RX ORDER — CEFDINIR 300 MG/1
300 CAPSULE ORAL 2 TIMES DAILY
Qty: 20 CAPSULE | Refills: 0 | Status: SHIPPED | OUTPATIENT
Start: 2019-12-02 | End: 2019-12-12

## 2019-12-02 RX ORDER — ALBUTEROL SULFATE 90 UG/1
1-2 AEROSOL, METERED RESPIRATORY (INHALATION) EVERY 4 HOURS PRN
Qty: 18 G | Refills: 0 | Status: SHIPPED | OUTPATIENT
Start: 2019-12-02

## 2019-12-02 RX ORDER — PREDNISONE 20 MG/1
TABLET ORAL
Qty: 10 TABLET | Refills: 0 | Status: SHIPPED | OUTPATIENT
Start: 2019-12-02 | End: 2020-06-15

## 2019-12-02 RX ADMIN — IPRATROPIUM BROMIDE AND ALBUTEROL SULFATE 3 ML: .5; 3 SOLUTION RESPIRATORY (INHALATION) at 20:38

## 2019-12-02 ASSESSMENT — MIFFLIN-ST. JEOR: SCORE: 1622.71

## 2019-12-02 ASSESSMENT — ENCOUNTER SYMPTOMS
HEADACHES: 1
EYES NEGATIVE: 1
RHINORRHEA: 1
PALPITATIONS: 0
FATIGUE: 1
ACTIVITY CHANGE: 1
SORE THROAT: 1
VOICE CHANGE: 1
WHEEZING: 1
COUGH: 1
CHEST TIGHTNESS: 1
MUSCULOSKELETAL NEGATIVE: 1
GASTROINTESTINAL NEGATIVE: 1

## 2019-12-02 NOTE — ED AVS SNAPSHOT
[facility] Emergency Department  [address] [facility] [facility] [address]  Phone:  [phone]  Fax:  [phone]                                    Nina Montes   MRN: [id number]    Department:  [facility] Emergency Department   Date of Visit:  12/2/2019           After Visit Summary Signature Page    I have received my discharge instructions, and my questions have been answered. I have discussed any challenges I see with this plan with the nurse or doctor.    ..........................................................................................................................................  Patient/Patient Representative Signature      ..........................................................................................................................................  Patient Representative Print Name and Relationship to Patient    ..................................................               ................................................  Date                                   Time    ..........................................................................................................................................  Reviewed by Signature/Title    ...................................................              ..............................................  Date                                               Time          22EPIC Rev 08/18

## 2019-12-03 NOTE — DISCHARGE INSTRUCTIONS
Use Medication as directed    Stop smoking.     Hydrate with fluids, rest, cool humidifier.  May use acetaminophen, ibuprofen prn.    For your Cough   The Buckwheat Honey Dose: Given   hour Prior to Bedtime  For children age under 1 year -Do not use due to botulism risk    2-5 years -  tsp (2.5 mL)   6-11 years -1 tsp (5 mL)   12-18 years -2 tsp (10 mL)     Guaifenesin    Adult dose -Not to exceed 2.4 g (2400mg) per day   Child age 6-12 years -100 mg every 4 hr, not to exceed 1.2 g (1200mg) per day    Pediatric, 2-6 years -50 mg every 4 hr as needed, not to exceed 600 mg per day    Cough medications is not recommended in children under 2 years.  With use of cough medications have combination medications be aware of products in the cough medication you are using to avoid overdose    Follow up with PCP in 3-5 days.    Go to Emergency Room if sx worsen or change, Shortness of breath, chest pain, persistent fevers, or painful breathing occur.     Patient voiced understanding of instructions given.

## 2019-12-03 NOTE — ED PROVIDER NOTES
History     Chief Complaint   Patient presents with     Cough     started about a week ago     HPI    Nina Montes is a 23 year old female who presents to the clinic today with a chief complaint of cough , shortness of breath., wheezing and painful cough for 1 week(s) with worsening symptoms over the past few days.  Her cough is described as dry, slightly productive, spasmodic.    The patient's symptoms are moderate and worsening.  Associated symptoms include chest pain, congestion, nasal congestion, rhinorrhea, sore throat, wheezing and headache. The patient's symptoms are exacerbated by exercise and lying down  Patient has been using ibuprofen, OTC cough suppressants and Tylenol  to improve symptoms.  Patient states she does have a history of asthma in the past, but has not had a new inhaler for a while.  Patient also smokes about a pack a day over the past 12 days has not been using tobacco.  Patient denies vaping.    Allergies:  No Known Allergies    Problem List:    Patient Active Problem List    Diagnosis Date Noted     Nexplanon 10/28/2016     Priority: Medium     Nexplanon inserted 10/28/2016 by Nimisha Vázquez MD  Lot# [id number]  Exp 11/2018       Reactive airway disease without complication 02/22/2016     Priority: Medium     Tobacco abuse 10/15/2013     Priority: Medium        Past Medical History:    Past Medical History:   Diagnosis Date     Acute maxillary sinusitis 10/17/2011     Infectious mononucleosis 10/17/2011     Lyme disease 2015     Mild major depression 1/23/2012     Nose fracture      Problems with hearing 10/31/2006       Past Surgical History:    Past Surgical History:   Procedure Laterality Date     PE TUBES       TONSILLECTOMY & ADENOIDECTOMY         Family History:    Family History   Problem Relation Age of Onset     Cancer Paternal Grandfather         throat     Alcohol/Drug Mother         recovered A&D     Chronic Obstructive Pulmonary Disease Mother      Alcohol/Drug Father   "       A&D     Thyroid Disease Sister      Bipolar Disorder Sister      Allergies Sister      Dementia Maternal Grandmother      Diabetes Maternal Grandfather      Diabetes Sister      Gastrointestinal Disease Brother         stomach ulcers     Anxiety Disorder Brother        Social History:  Marital Status:  Single [1]  Social History     Tobacco Use     Smoking status: Current Every Day Smoker     Packs/day: 1.00     Years: 4.00     Pack years: 4.00     Last attempt to quit: 2013     Years since quittin.5     Smokeless tobacco: Never Used     Tobacco comment: smoking 10-20cig/day - down to 10cig/day with pregnancy   Substance Use Topics     Alcohol use: Yes     Alcohol/week: 0.0 standard drinks     Comment: rare- quit with pregnancy     Drug use: No     Types: Marijuana     Comment: smokes marijuana rarely, last use 2015        Medications:    albuterol (PROAIR HFA/PROVENTIL HFA/VENTOLIN HFA) 108 (90 Base) MCG/ACT inhaler  cefdinir (OMNICEF) 300 MG capsule  predniSONE (DELTASONE) 20 MG tablet  ibuprofen (ADVIL,MOTRIN) 400 MG tablet  levonorgestrel-ethinyl estradiol (AVIANE/ALESSE/LESSINA) 0.1-20 MG-MCG tablet          Review of Systems   Constitutional: Positive for activity change and fatigue.   HENT: Positive for congestion, rhinorrhea, sore throat and voice change (hoarse voice ).    Eyes: Negative.    Respiratory: Positive for cough, chest tightness and wheezing.         Painful cough and breathing.    Cardiovascular: Negative for palpitations and leg swelling.   Gastrointestinal: Negative.    Genitourinary: Negative.    Musculoskeletal: Negative.    Skin: Negative.    Neurological: Positive for headaches.   All other systems reviewed and are negative.      Physical Exam   BP: (!) 156/92  Pulse: 97  Temp: 98.4  F (36.9  C)  Resp: 16  Height: 165.1 cm (5' 5\")  Weight: 86.2 kg (190 lb)(stated)  SpO2: 98 %      Physical Exam     BP (!) 156/92   Pulse 97   Temp 98.4  F (36.9  C) (Oral)   Resp 16   " "Ht 1.651 m (5' 5\")   Wt 86.2 kg (190 lb)   SpO2 98%   BMI 31.62 kg/m    GENERAL APPEARANCE: healthy, alert and no distress  EYES: EOMI,  PERRL, conjunctiva clear  HENT: TM's normal bilaterally and nose and mouth without erythema, ulcers or lesions  NECK: supple, nontender, no lymphadenopathy  RESP: positive decreased breath sounds bilaterally throughout with left sided wheezing. Positive tenderness with palpation to chest.   CV: regular rates and rhythm, normal S1 S2, no murmur noted  ABDOMEN:  soft, nontender, no HSM or masses and bowel sounds normal  NEURO: Normal strength and tone, sensory exam grossly normal,  normal speech and mentation  SKIN: no suspicious lesions or rashes          ED Course        Procedures              Critical Care time:  none               Results for orders placed or performed during the hospital encounter of 12/02/19 (from the past 24 hour(s))   Chest XR,  PA & LAT    Narrative    XR CHEST TWO VIEWS   12/2/2019 8:57 PM     HISTORY: Cough, painful breathing and coughing, wheezing, with history  of tobacco use. Rule out pneumonia, mass, or pneumothorax.    COMPARISON: Chest x-ray on 12/17/2006      Impression    IMPRESSION: No acute airspace disease.       Medications   ipratropium - albuterol 0.5 mg/2.5 mg/3 mL (DUONEB) neb solution 3 mL (3 mLs Nebulization Given 12/2/19 2038)   Post neb treatment increased breath sounds bilaterally throughout with decreased wheezing however wheezing still noted on the right middle and lower lobes along with Rales noted to right middle and lower lobes.      Assessments & Plan (with Medical Decision Making)     I have reviewed the nursing notes.    I have reviewed the findings, diagnosis, plan and need for follow up with the patient.   23-year-old female presents the urgent care with cough and congestion for the past week and painful cough over the past few days.  See exam findings above.  Patient does use tobacco but does not not vape.  Patient also " has a history of asthma, but has been without an inhaler for a while.  Chest x-ray obtained in office today and was negative for pneumonia however post DuoNeb treatment persistent wheezing and Rales noted to the right middle and lower lobes.  Patient given prescription prednisone, pro-air inhaler as well as prescription cefdinir for bronchitis symptoms, cough, and history of tobacco use and asthma.  Will cover for possible early bacterial infection due to her risk factors.  Patient informed to stop smoking and to follow-up with primary care doctor for recheck in few days.  Patient to return sooner if symptoms worsen or change.  These were discussed with patient given on discharge paperwork.  Chest pain was reproducible in office and I suspect is more of a costochondritis.  No concerns for pulmonary embolism or cardiac related chest pain at this time.      Discharge Medication List as of 12/2/2019  9:19 PM      START taking these medications    Details   albuterol (PROAIR HFA/PROVENTIL HFA/VENTOLIN HFA) 108 (90 Base) MCG/ACT inhaler Inhale 1-2 puffs into the lungs every 4 hours as needed for shortness of breath / dyspnea or wheezing, Disp-18 g, R-0, [facility] may dispense brand covered by insurance (Proair, or proventil or ventolin or generic albuterol inhaler)      cefdinir (OMNICEF) 300 MG capsule Take 1 capsule (300 mg) by mouth 2 times daily for 10 days, Disp-20 capsule, R-0, Local Print      predniSONE (DELTASONE) 20 MG tablet Take two tablets (= 40mg) each day for 5 (five) days, Disp-10 tablet, R-0, Local Print             Final diagnoses:   Cough   Tobacco abuse       12/2/2019   [facility] EMERGENCY DEPARTMENT     Omaira Blood PA-C  12/02/19 2719

## 2020-06-02 ENCOUNTER — VIRTUAL VISIT (OUTPATIENT)
Dept: FAMILY MEDICINE | Facility: OTHER | Age: 25
End: 2020-06-02

## 2020-06-02 NOTE — PROGRESS NOTES
"Date: 2020 15:32:10  Clinician: Elier Ko  Clinician NPI: [id number]  Patient: Nina Montes  Patient : 1995  Patient Address: [address] [place] [address] [address], [place] [id number]  Patient Phone: [phone]  Visit Protocol: URI  Patient Summary:  Nina is a 24 year old ( : 1995 ) female who initiated a Visit for cold, sinus infection, or influenza. When asked the question \"Please sign me up to receive news, health information and promotions from [employer].\", Nina responded \"No\".    Nina states her symptoms started gradually 10-13 days ago.   Her symptoms consist of wheezing, enlarged lymph nodes, malaise, myalgia, a cough, and a headache. She is experiencing mild difficulty breathing with activities but can speak normally in full sentences.   Symptom details     Cough: Nina coughs a few times an hour and her cough is more bothersome at night. Phlegm comes into her throat when she coughs. She does not believe her cough is caused by post-nasal drip. The color of the phlegm is white, green, and yellow.     Wheezing: Nina has not ever been diagnosed with asthma. Additional wheezing details as reported by the patient (free text): Mainly happens with coughing fits at night, I have an inhaler but that hasn't been helping like it should. But difficult to catch my breath and hard for me to breathe normally after the coughing stops       Headache: She states the headache is moderate (4-6 on a 10 point pain scale).      Nina denies having nausea, teeth pain, ageusia, diarrhea, sore throat, anosmia, facial pain or pressure, fever, nasal congestion, vomiting, rhinitis, ear pain, and chills. She also denies having recent facial or sinus surgery in the past 60 days, double sickening (worsening symptoms after initial improvement), and taking antibiotic medication for the symptoms.   Precipitating events  She has not recently been exposed to someone with influenza. Nina has been in close contact with the " following high risk individuals: children under the age of 5 and people with asthma, heart disease or diabetes.   Pertinent COVID-19 (Coronavirus) information  In the past 14 days, Nina has not worked in a congregate living setting.   She does not work or volunteer as healthcare worker or a  and does not work or volunteer in a healthcare facility.   Nina also has not lived in a congregate living setting in the past 14 days. She does not live with a healthcare worker.   Nina has not had a close contact with a laboratory-confirmed COVID-19 patient within 14 days of symptom onset.   Pertinent medical history  Nina does not get yeast infections when she takes antibiotics.   Nina does not need a return to work/school note.   Weight: 206 lbs   Nina smokes or uses smokeless tobacco.   She denies pregnancy and denies breastfeeding. She has menstruated in the past month.   Additional information as reported by the patient (free text): I have had bronchitis before and this feels like what it could be. Cough medicine hasn't helped and my inhaler hasn't been helping me be able to catch my breath at night.   Weight: 206 lbs  A synchronous phone visit was initiated by the provider for the following reason: clarification of symptoms    MEDICATIONS: Falmina (28) oral, ALLERGIES: NKDA  Clinician Response:  Dear Nina,      Your symptoms show that you may have coronavirus (COVID-19). This illness can cause fever, cough and trouble breathing. Many people get a mild case and get better on their own. Some people can get very sick.  What should I do?  We would like to test you for this virus. This will be a curbside test done outside the clinic.  Please call [phone] to schedule your visit. Explain that you were referred by [facility] to have a COVID-19 test. Be ready to share your [facility] visit ID number.  Starting now:  Stay at least 6 feet away from others. (If someone will drive you to your test, stay in the  "backseat, as far away from the  as you can.)   Don't go to work, school or anywhere else. When it's time for your test, go straight to the testing site. Don't make any stops on the way there or back.   Wash your hands and face often. Use soap and water.   Cover your mouth and nose with a mask, tissue or washcloth.   Don't touch anyone. No hugging, kissing or handshakes.  While at home   Stay home and away from others (self-isolate) until:  You've had no fever---and no medicine that reduces fever---for 3 full days (72 hours). And...  Your other symptoms have gotten better. For example, your cough or breathing has improved. And...  At least 10 days have passed since your symptoms started.  During this time:  Stay in your own room (and use your own bathroom), if you can.  Don't go to work, school or anywhere else.  Stay away from others in your home. No hugging, kissing or shaking hands.  Don't let anyone visit.  Cover your mouth and nose with a mask, tissue or washcloth to avoid spreading germs.  Clean \"high touch\" surfaces often (doorknobs, counters, handles, etc.). Use a household cleaning spray or wipes.  Wash your hands and face often. Use soap and water.  How can I take care of myself?  1. Get lots of rest. Drink extra fluids (unless your doctor has told you not to).  2. Take Tylenol (acetaminophen) for fever or pain. If you have liver or kidney problems, ask your family doctor if it's okay to take Tylenol.  Adults can take either:   650 mg (two 325 mg pills) every 4 to 6 hours, or...  1,000 mg (two 500 mg pills) every 8 hours as needed.   Note: Don't take more than 3,000 mg in one day.   Acetaminophen is found in many medicines (both prescribed and over-the-counter medicines). Read all labels to be sure you don't take too much.   For children, check the Tylenol bottle for the right dose. The dose is based on the child's age or weight.  3. If you have other health problems (like cancer, heart failure, an " organ transplant or severe kidney disease): Call your specialty clinic if you don't feel better in the next 2 days.  4. Know when to call 911: If your breathing is so bad that it keeps you from doing normal activities, call 911 or go to the emergency room. Tell them that you've been staying home and may have COVID-19.  5. Sign up for [employer]. We know it's scary to hear that you might have COVID-19. We want to track your symptoms to make sure you're okay over the next 2 weeks. Please look for an email from [employer]---this is a free, online program that we'll use to keep in touch. To sign up, follow the link in the email. Learn more at [email or web address].  6. The following will serve as your written order for this Covid Test ordered by me for the indication of suspected Covid [Z20.828]: The test will be ordered in "[employer] our electronic health record after you are scheduled and will show as ordered and authorized by Yamil Lion MD   Order: Covid-19 (Coronavirus) PCR for SYMPTOMATIC testing from [facility]  Where can I get more information?  To learn more about COVID-19 and how to care for yourself at home, please visit the CDC website at https://www.cdc.gov/coronavirus/2019-ncov/about/steps-when-sick.html.  For more about your care at [facility], please visit [email or web address]  If you'd like to be part of a COVID-19 clinical trial (research study) at the [facility], go to [email or web address] for details.    Diagnosis: Acute bronchitis, unspecified  Diagnosis ICD: J20.9  Triage Notes: I reviewed the patient's history, verified their identity, and explained the Visit process.    patient has congestion, coughing,  with asthma.  sent for covid testing too.  Synchronous Triage: phone, status: completed, duration: 285 seconds  Prescription: prednisone 20 mg oral tablet 10 tablet, 5 days supply. Take 1 tablet by mouth 2 times per day  for 5 days. Refills: 0, Refill as needed: no, Allow substitutions: yes  Prescription: benzonatate (Tessalon Perles) 100 mg oral capsule 30 capsule, 10 days supply. Take 1 capsule by mouth 3 times per day for 10 days as needed. Refills: 0, Refill as needed: no, Allow substitutions: yes  Prescription: albuterol sulfate (Proventil HFA) 90 mcg/actuation inhalation HFA aerosol inhaler 1 200 inhalation aerosol with adapter (proventil hfa or equivalent), 0 days supply. Inhale 2 puffs every 4 hours as needed. Refills: 0, Refill as needed: no, Allow substitutions: yes  Prescription: azithromycin (Zithromax) 500 mg oral tablet 3 tablet, 3 days supply. Take 1 tablet by mouth 1 time per day for 3 days. Refills: 0, Refill as needed: no, Allow substitutions: yes  Pharmacy: [facility] - [phone] - [address] [place] [address]

## 2020-06-15 ENCOUNTER — VIRTUAL VISIT (OUTPATIENT)
Dept: FAMILY MEDICINE | Facility: CLINIC | Age: 25
End: 2020-06-15
Payer: COMMERCIAL

## 2020-06-15 DIAGNOSIS — Z30.011 ENCOUNTER FOR INITIAL PRESCRIPTION OF CONTRACEPTIVE PILLS: ICD-10-CM

## 2020-06-15 PROCEDURE — 99213 OFFICE O/P EST LOW 20 MIN: CPT | Mod: TEL | Performed by: FAMILY MEDICINE

## 2020-06-15 RX ORDER — LEVONORGESTREL/ETHIN.ESTRADIOL 0.1-0.02MG
1 TABLET ORAL DAILY
Qty: 84 TABLET | Refills: 3 | Status: SHIPPED | OUTPATIENT
Start: 2020-06-15 | End: 2022-01-05

## 2020-06-15 NOTE — PROGRESS NOTES
"Nina Montes is a 24 year old female who is being evaluated via a billable telephone visit.      24 yr old female here for birth control refills, she reports no side effects.     The patient has been notified of following:     \"This telephone visit will be conducted via a call between you and your physician/provider. We have found that certain health care needs can be provided without the need for a physical exam.  This service lets us provide the care you need with a short phone conversation.  If a prescription is necessary we can send it directly to your pharmacy.  If lab work is needed we can place an order for that and you can then stop by our lab to have the test done at a later time.    Telephone visits are billed at different rates depending on your insurance coverage. During this emergency period, for some insurers they may be billed the same as an in-person visit.  Please reach out to your insurance provider with any questions.    If during the course of the call the physician/provider feels a telephone visit is not appropriate, you will not be charged for this service.\"    Patient has given verbal consent for Telephone visit?  Yes        How would you like to obtain your AVS? MyChart    Subjective     Nina Montes is a 24 year old female who presents via phone visit today for the following health issues:    HPI  Medication Followup of Contraception    Taking Medication as prescribed: yes    Side Effects:  None    Medication Helping Symptoms:  yes             Patient Active Problem List   Diagnosis     Tobacco abuse     Reactive airway disease without complication     Nexplanon     Past Surgical History:   Procedure Laterality Date     PE TUBES       TONSILLECTOMY & ADENOIDECTOMY         Social History     Tobacco Use     Smoking status: Current Every Day Smoker     Packs/day: 1.00     Years: 4.00     Pack years: 4.00     Last attempt to quit: 2013     Years since quittin.0     Smokeless " tobacco: Never Used     Tobacco comment: smoking 10-20cig/day - down to 10cig/day with pregnancy   Substance Use Topics     Alcohol use: Yes     Alcohol/week: 0.0 standard drinks     Comment: rare- quit with pregnancy     Family History   Problem Relation Age of Onset     Cancer Paternal Grandfather         throat     Alcohol/Drug Mother         recovered A&D     Chronic Obstructive Pulmonary Disease Mother      Alcohol/Drug Father         A&D     Thyroid Disease Sister      Bipolar Disorder Sister      Allergies Sister      Dementia Maternal Grandmother      Diabetes Maternal Grandfather      Diabetes Sister      Gastrointestinal Disease Brother         stomach ulcers     Anxiety Disorder Brother          Current Outpatient Medications   Medication Sig Dispense Refill     albuterol (PROAIR HFA/PROVENTIL HFA/VENTOLIN HFA) 108 (90 Base) MCG/ACT inhaler Inhale 1-2 puffs into the lungs every 4 hours as needed for shortness of breath / dyspnea or wheezing 18 g 0     levonorgestrel-ethinyl estradiol (AVIANE) 0.1-20 MG-MCG tablet Take 1 tablet by mouth daily 84 tablet 3     No Known Allergies  BP Readings from Last 3 Encounters:   12/02/19 (!) 156/92   09/25/18 120/75   09/25/18 116/72    Wt Readings from Last 3 Encounters:   12/02/19 86.2 kg (190 lb)   09/25/18 88.9 kg (196 lb)   09/25/18 88.9 kg (196 lb)                    Reviewed and updated as needed this visit by Provider  Tobacco  Allergies  Meds  Problems  Med Hx  Surg Hx  Fam Hx         Review of Systems   Constitutional, HEENT, cardiovascular, pulmonary, gi and gu systems are negative, except as otherwise noted.       Objective   Reported vitals:  There were no vitals taken for this visit.   healthy, alert and no distress  PSYCH: Alert and oriented times 3; coherent speech, normal   rate and volume, able to articulate logical thoughts, able   to abstract reason, no tangential thoughts, no hallucinations   or delusions  Her affect is normal  RESP: No  cough, no audible wheezing, able to talk in full sentences  Remainder of exam unable to be completed due to telephone visits    Diagnostic Test Results:  Labs reviewed in Epic        Assessment/Plan:  1. Encounter for initial prescription of contraceptive pills  medication refilled  - levonorgestrel-ethinyl estradiol (AVIANE) 0.1-20 MG-MCG tablet; Take 1 tablet by mouth daily  Dispense: 84 tablet; Refill: 3    Return in about 6 months (around 12/15/2020) for In-Clinic Visit.      Phone call duration:  5-10 minutes    Penny Martinez MD

## 2020-07-28 ENCOUNTER — E-VISIT (OUTPATIENT)
Dept: FAMILY MEDICINE | Facility: CLINIC | Age: 25
End: 2020-07-28
Payer: COMMERCIAL

## 2020-07-28 DIAGNOSIS — N76.4 VULVAR ABSCESS: Primary | ICD-10-CM

## 2020-07-28 PROCEDURE — 99421 OL DIG E/M SVC 5-10 MIN: CPT | Performed by: INTERNAL MEDICINE

## 2020-07-29 RX ORDER — SULFAMETHOXAZOLE/TRIMETHOPRIM 800-160 MG
1 TABLET ORAL 2 TIMES DAILY
Qty: 14 TABLET | Refills: 0 | Status: SHIPPED | OUTPATIENT
Start: 2020-07-29 | End: 2020-08-05

## 2020-08-10 ENCOUNTER — E-VISIT (OUTPATIENT)
Dept: FAMILY MEDICINE | Facility: CLINIC | Age: 25
End: 2020-08-10
Payer: COMMERCIAL

## 2020-08-10 DIAGNOSIS — L23.9 ALLERGIC CONTACT DERMATITIS, UNSPECIFIED TRIGGER: Primary | ICD-10-CM

## 2020-08-10 PROCEDURE — 99421 OL DIG E/M SVC 5-10 MIN: CPT | Performed by: INTERNAL MEDICINE

## 2020-08-10 RX ORDER — PREDNISONE 20 MG/1
TABLET ORAL
Qty: 20 TABLET | Refills: 0 | Status: SHIPPED | OUTPATIENT
Start: 2020-08-10 | End: 2020-12-09

## 2020-08-10 RX ORDER — HYDROXYZINE HYDROCHLORIDE 25 MG/1
25 TABLET, FILM COATED ORAL 3 TIMES DAILY PRN
Qty: 30 TABLET | Refills: 0 | Status: SHIPPED | OUTPATIENT
Start: 2020-08-10 | End: 2020-12-09

## 2020-09-02 ENCOUNTER — MYC REFILL (OUTPATIENT)
Dept: FAMILY MEDICINE | Facility: CLINIC | Age: 25
End: 2020-09-02

## 2020-09-02 DIAGNOSIS — Z30.011 ENCOUNTER FOR INITIAL PRESCRIPTION OF CONTRACEPTIVE PILLS: ICD-10-CM

## 2020-09-02 RX ORDER — LEVONORGESTREL/ETHIN.ESTRADIOL 0.1-0.02MG
1 TABLET ORAL DAILY
Qty: 84 TABLET | Refills: 3 | Status: CANCELLED | OUTPATIENT
Start: 2020-09-02

## 2020-09-14 ENCOUNTER — TELEPHONE (OUTPATIENT)
Dept: FAMILY MEDICINE | Facility: CLINIC | Age: 25
End: 2020-09-14

## 2020-09-14 NOTE — TELEPHONE ENCOUNTER
Patient Quality Outreach Summary      Summary:    Patient is due/failing the following:   ACT needed    Type of outreach:    Sent letter.    Questions for provider review:    None                                                                                                                    LINDA Chen MA

## 2020-09-14 NOTE — LETTER
September 14, 2020      Nina Montes  [address] [place] [address]  [place] [address]        Dear Nina,     Your [facility] Team works hard to make sure that you and your family receive exceptional care. Enclosed you will find a copy of the Asthma Control Test (ACT) that our clinic uses to monitor and manage your asthma. This test is an assessment tool that we use to determine how well your asthma is controlled.  Please complete the enclosed form and return in the provided envelope.  Thank you for trusting us with your health care.    Sincerely,        Your [facility]/[place]

## 2020-09-19 NOTE — TELEPHONE ENCOUNTER
Patient due for office visit. Message sent to patient to schedule. Also noted on prescription    Kathy RIVERS RN   Specialty Clinics    [place]

## 2020-11-29 ENCOUNTER — HEALTH MAINTENANCE LETTER (OUTPATIENT)
Age: 25
End: 2020-11-29

## 2020-12-09 ENCOUNTER — OFFICE VISIT (OUTPATIENT)
Dept: FAMILY MEDICINE | Facility: CLINIC | Age: 25
End: 2020-12-09
Payer: COMMERCIAL

## 2020-12-09 VITALS
DIASTOLIC BLOOD PRESSURE: 68 MMHG | OXYGEN SATURATION: 98 % | BODY MASS INDEX: 32.3 KG/M2 | HEIGHT: 66 IN | SYSTOLIC BLOOD PRESSURE: 120 MMHG | HEART RATE: 96 BPM | TEMPERATURE: 98.6 F | WEIGHT: 201 LBS

## 2020-12-09 DIAGNOSIS — Z23 NEED FOR PROPHYLACTIC VACCINATION AND INOCULATION AGAINST INFLUENZA: ICD-10-CM

## 2020-12-09 DIAGNOSIS — R10.2 PELVIC PAIN IN FEMALE: ICD-10-CM

## 2020-12-09 DIAGNOSIS — N76.0 BV (BACTERIAL VAGINOSIS): Primary | ICD-10-CM

## 2020-12-09 DIAGNOSIS — B96.89 BV (BACTERIAL VAGINOSIS): Primary | ICD-10-CM

## 2020-12-09 DIAGNOSIS — R39.9 SYMPTOMS INVOLVING URINARY SYSTEM: ICD-10-CM

## 2020-12-09 LAB
ALBUMIN UR-MCNC: NEGATIVE MG/DL
APPEARANCE UR: CLEAR
BILIRUB UR QL STRIP: NEGATIVE
COLOR UR AUTO: YELLOW
GLUCOSE UR STRIP-MCNC: NEGATIVE MG/DL
HGB UR QL STRIP: ABNORMAL
KETONES UR STRIP-MCNC: NEGATIVE MG/DL
LEUKOCYTE ESTERASE UR QL STRIP: NEGATIVE
NITRATE UR QL: NEGATIVE
PH UR STRIP: 6 PH (ref 5–7)
RBC #/AREA URNS AUTO: NORMAL /HPF
SOURCE: ABNORMAL
SP GR UR STRIP: 1.02 (ref 1–1.03)
SPECIMEN SOURCE: ABNORMAL
UROBILINOGEN UR STRIP-ACNC: 0.2 EU/DL (ref 0.2–1)
WBC #/AREA URNS AUTO: NORMAL /HPF
WET PREP SPEC: ABNORMAL

## 2020-12-09 PROCEDURE — 99213 OFFICE O/P EST LOW 20 MIN: CPT | Mod: 25 | Performed by: NURSE PRACTITIONER

## 2020-12-09 PROCEDURE — 90471 IMMUNIZATION ADMIN: CPT | Performed by: NURSE PRACTITIONER

## 2020-12-09 PROCEDURE — 81001 URINALYSIS AUTO W/SCOPE: CPT | Performed by: NURSE PRACTITIONER

## 2020-12-09 PROCEDURE — 87210 SMEAR WET MOUNT SALINE/INK: CPT | Performed by: NURSE PRACTITIONER

## 2020-12-09 PROCEDURE — 87591 N.GONORRHOEAE DNA AMP PROB: CPT | Performed by: NURSE PRACTITIONER

## 2020-12-09 PROCEDURE — 87491 CHLMYD TRACH DNA AMP PROBE: CPT | Performed by: NURSE PRACTITIONER

## 2020-12-09 PROCEDURE — 90686 IIV4 VACC NO PRSV 0.5 ML IM: CPT | Performed by: NURSE PRACTITIONER

## 2020-12-09 RX ORDER — METRONIDAZOLE 500 MG/1
500 TABLET ORAL 2 TIMES DAILY
Qty: 14 TABLET | Refills: 0 | Status: SHIPPED | OUTPATIENT
Start: 2020-12-09 | End: 2020-12-16

## 2020-12-09 ASSESSMENT — ASTHMA QUESTIONNAIRES
QUESTION_4 LAST FOUR WEEKS HOW OFTEN HAVE YOU USED YOUR RESCUE INHALER OR NEBULIZER MEDICATION (SUCH AS ALBUTEROL): TWO OR THREE TIMES PER WEEK
QUESTION_5 LAST FOUR WEEKS HOW WOULD YOU RATE YOUR ASTHMA CONTROL: SOMEWHAT CONTROLLED
QUESTION_2 LAST FOUR WEEKS HOW OFTEN HAVE YOU HAD SHORTNESS OF BREATH: ONCE OR TWICE A WEEK
QUESTION_1 LAST FOUR WEEKS HOW MUCH OF THE TIME DID YOUR ASTHMA KEEP YOU FROM GETTING AS MUCH DONE AT WORK, SCHOOL OR AT HOME: SOME OF THE TIME
ACT_TOTALSCORE: 17
QUESTION_3 LAST FOUR WEEKS HOW OFTEN DID YOUR ASTHMA SYMPTOMS (WHEEZING, COUGHING, SHORTNESS OF BREATH, CHEST TIGHTNESS OR PAIN) WAKE YOU UP AT NIGHT OR EARLIER THAN USUAL IN THE MORNING: ONCE OR TWICE

## 2020-12-09 ASSESSMENT — MIFFLIN-ST. JEOR: SCORE: 1675.54

## 2020-12-09 NOTE — PROGRESS NOTES
"Subjective     Nina Montes is a 24 year old female who presents to clinic today for the following health issues:    HPI         Genitourinary - Female  Onset/Duration: yesterday  Description:   Painful urination (Dysuria): YES yesterday, not today           Frequency: YES  Blood in urine (Hematuria): YES yesterday, not today  Delay in urine (Hesitency): no  Intensity: moderate; yesterday was worse  Progression of Symptoms:  Improving today  Accompanying Signs & Symptoms:  Fever/chills: no  Flank pain: YES  Nausea and vomiting: no  Vaginal symptoms: none  Abdominal/Pelvic Pain: YES- \"somewhat\"  History:   History of frequent UTI s: no  History of kidney stones: no  Sexually Active: YES - new partner  Possibility of pregnancy: Don't Know  lmp- two weeks ago (approx)  Precipitating or alleviating factors: None  Therapies tried and outcome: Increase fluid intake; aleve        Review of Systems   Constitutional, HEENT, cardiovascular, pulmonary, gi and gu systems are negative, except as otherwise noted.      Objective    /68 (BP Location: Right arm)   Pulse 96   Temp 98.6  F (37  C) (Tympanic)   Ht 1.664 m (5' 5.5\")   Wt 91.2 kg (201 lb)   LMP 11/25/2020 (Approximate)   SpO2 98%   Breastfeeding No   BMI 32.94 kg/m    Body mass index is 32.94 kg/m .  Physical Exam   GENERAL: healthy, alert and no distress  RESP: lungs clear to auscultation - no rales, rhonchi or wheezes  CV: regular rate and rhythm, normal S1 S2, no S3 or S4, no murmur, click or rub, no peripheral edema and peripheral pulses strong  ABDOMEN: no organomegaly or masses, soft. Tender to palpation in the pelvic area.   (female): normal female external genitalia, normal urethral meatus, vaginal mucosa, normal cervix/adnexa/uterus without masses or discharge. No cervical motion tenderness    Results for orders placed or performed in visit on 12/09/20 (from the past 24 hour(s))   *UA reflex to Microscopic and Culture ([facility] (except [place] and [place])    Specimen: Midstream Urine   Result Value Ref [facility]    Color Urine Yellow     Appearance Urine Clear     Glucose Urine Negative NEG^Negative mg/dL    Bilirubin Urine Negative NEG^Negative    Ketones Urine Negative NEG^Negative mg/dL    Specific Gravity Urine 1.025 1.003 - 1.035    Blood Urine Trace (A) NEG^Negative    pH Urine 6.0 5.0 - 7.0 pH    Protein Albumin Urine Negative NEG^Negative mg/dL    Urobilinogen Urine 0.2 0.2 - 1.0 EU/dL    Nitrite Urine Negative NEG^Negative    Leukocyte Esterase Urine Negative NEG^Negative    Source Midstream Urine    Urine Microscopic   Result Value Ref [facility]    WBC Urine 0 - 5 OTO5^0 - 5 /HPF    RBC Urine O - 2 OTO2^O - 2 /HPF   Wet prep    Specimen: Vagina   Result Value Ref [facility]    Specimen Description Vagina     Wet Prep No Trichomonas seen     Wet Prep Clue cells seen  Moderate   (A)     Wet Prep No yeast seen     Wet Prep WBC'S seen  Few              Assessment & Plan     BV (bacterial vaginosis)  - metroNIDAZOLE (FLAGYL) 500 MG tablet; Take 1 tablet (500 mg) by mouth 2 times daily for 7 days    Pelvic pain in female  - Wet prep  - NEISSERIA GONORRHOEA PCR  - CHLAMYDIA TRACHOMATIS PCR    Symptoms involving urinary system  - *UA reflex to Microscopic and Culture ([facility] (except [place] and [place])  - Urine Microscopic    Need for prophylactic vaccination and inoculation against influenza  - INFLUENZA VACCINE IM > 6 MONTHS VALENT IIV4 [46463]     Patient Instructions   Bacterial Vaginosis:  This is a common inflammation of the vagina caused by bacteria.    It appears to be an overgrowth of several types of bacteria normally located in the vagina.  It is not thought to be a sexually transmitted process and partners do not need to be treated.      The most common symptoms include:  Gray or yellowish discharge, odor (often fishy), itching, irritation, sometimes pelvic discomfort.      This is treated with  metronidazole - topical once nightly X 5 days or oral 500 mg twice daily X 7 days.   Do not drink any alcohol while taking either of these medications.   Avoid intercourse until your symptoms have resolved.    Return to clinic if symptoms do not resolve or worsen despite treatment.        Thank you for choosing [facility].  You may be receiving an email and/or telephone survey request from [facility] Customer Experience regarding your visit today.  Please take a few minutes to respond to the survey to let us know how we are doing.      If you have questions or concerns, please contact us via [employer] or you can contact your care team at [phone].    Our Clinic hours are:  Monday 6:40 am  to 7:00 pm  Tuesday -Friday 6:40 am to 5:00 pm    The [place] outpatient lab hours are:  Monday - Friday 6:10 am to 4:45 pm  Saturdays 7:00 am to 11:00 am  Appointments are required, call [phone]    If you have clinical questions after hours or would like to schedule an appointment,  call the clinic at [phone].        Return in about 2 weeks (around 12/23/2020), or if symptoms worsen or fail to improve.    The risks, benefits and treatment options of prescribed medications or other treatments have been discussed with the patient. The patient verbalized their understanding and should call or follow up if no improvement or if they develop further problems.    SHANNAN Pierce CNP  [facility]

## 2020-12-09 NOTE — PATIENT INSTRUCTIONS
Bacterial Vaginosis:  This is a common inflammation of the vagina caused by bacteria.    It appears to be an overgrowth of several types of bacteria normally located in the vagina.  It is not thought to be a sexually transmitted process and partners do not need to be treated.      The most common symptoms include:  Gray or yellowish discharge, odor (often fishy), itching, irritation, sometimes pelvic discomfort.      This is treated with metronidazole - topical once nightly X 5 days or oral 500 mg twice daily X 7 days.   Do not drink any alcohol while taking either of these medications.   Avoid intercourse until your symptoms have resolved.    Return to clinic if symptoms do not resolve or worsen despite treatment.        Thank you for choosing [facility].  You may be receiving an email and/or telephone survey request from [facility] Customer Experience regarding your visit today.  Please take a few minutes to respond to the survey to let us know how we are doing.      If you have questions or concerns, please contact us via [employer] or you can contact your care team at [phone].    Our Clinic hours are:  Monday 6:40 am  to 7:00 pm  Tuesday -Friday 6:40 am to 5:00 pm    The [place] outpatient lab hours are:  Monday - Friday 6:10 am to 4:45 pm  Saturdays 7:00 am to 11:00 am  Appointments are required, call [phone]    If you have clinical questions after hours or would like to schedule an appointment,  call the clinic at [phone].

## 2020-12-10 LAB
C TRACH DNA SPEC QL NAA+PROBE: NEGATIVE
N GONORRHOEA DNA SPEC QL NAA+PROBE: NEGATIVE
SPECIMEN SOURCE: NORMAL
SPECIMEN SOURCE: NORMAL

## 2020-12-10 ASSESSMENT — ASTHMA QUESTIONNAIRES: ACT_TOTALSCORE: 17

## 2020-12-31 ENCOUNTER — E-VISIT (OUTPATIENT)
Dept: FAMILY MEDICINE | Facility: CLINIC | Age: 25
End: 2020-12-31
Payer: COMMERCIAL

## 2020-12-31 DIAGNOSIS — K04.7 DENTAL INFECTION: Primary | ICD-10-CM

## 2020-12-31 PROCEDURE — 99421 OL DIG E/M SVC 5-10 MIN: CPT | Performed by: FAMILY MEDICINE

## 2020-12-31 RX ORDER — CLINDAMYCIN HCL 300 MG
300 CAPSULE ORAL 4 TIMES DAILY
Qty: 40 CAPSULE | Refills: 0 | Status: SHIPPED | OUTPATIENT
Start: 2020-12-31 | End: 2021-01-10

## 2020-12-31 NOTE — PATIENT INSTRUCTIONS
Thank you for choosing us for your care. I have placed an order for a prescription so that you can start treatment. View your full visit summary for details by clicking on the link below. Your pharmacist will able to address any questions you may have about the medication.     If you're not feeling better within 5-7 days, please schedule an appointment.  You can schedule an appointment right here in [facility], or call [phone]  If the visit is for the same symptoms as your eVisit, we'll refund the cost of your eVisit if seen within seven days.

## 2021-09-19 ENCOUNTER — HEALTH MAINTENANCE LETTER (OUTPATIENT)
Age: 26
End: 2021-09-19

## 2021-12-05 NOTE — PROGRESS NOTES
SUBJECTIVE:   Nina Montes is a 22 year old female who presents to clinic today for the following health issues:      Chief Complaint   Patient presents with     Gyn Exam     only         Patient is a 22 yr old female here for a pap smear. She has never had one before now. No vaginal symptoms.     Problem list and histories reviewed & adjusted, as indicated.  Additional history: as documented    Patient Active Problem List   Diagnosis     Tobacco abuse     Reactive airway disease without complication     Nexplanon     Past Surgical History:   Procedure Laterality Date     PE TUBES       TONSILLECTOMY & ADENOIDECTOMY         Social History   Substance Use Topics     Smoking status: Current Every Day Smoker     Packs/day: 1.00     Years: 4.00     Last attempt to quit: 6/1/2013     Smokeless tobacco: Never Used      Comment: smoking 10-20cig/day - down to 10cig/day with pregnancy     Alcohol use 0.0 oz/week     0 Standard drinks or equivalent per week      Comment: rare- quit with pregnancy     Family History   Problem Relation Age of Onset     Cancer Paternal Grandfather      throat     Alcohol/Drug Mother      recovered A&D     Chronic Obstructive Pulmonary Disease Mother      Alcohol/Drug Father      A&D     Thyroid Disease Sister      Bipolar Disorder Sister      Allergies Sister      Dementia Maternal Grandmother      Diabetes Maternal Grandfather      Diabetes Sister      GASTROINTESTINAL DISEASE Brother      stomach ulcers     Anxiety Disorder Brother          Current Outpatient Prescriptions   Medication Sig Dispense Refill     ibuprofen (ADVIL,MOTRIN) 400 MG tablet Take 1-2 tablets (400-800 mg) by mouth every 6 hours as needed for other (cramping) 40 tablet 0     levonorgestrel-ethinyl estradiol (AVIANE,ALESSE,LESSINA) 0.1-20 MG-MCG per tablet Take 1 tablet by mouth daily 84 tablet 3     No Known Allergies  BP Readings from Last 3 Encounters:   09/25/18 120/75   09/25/18 116/72   09/27/17 149/80    Wt  Patient's mother given discharge papers and instructions regarding her child's care. Patient's mother verbalized understanding and stated not having questions or concerns regarding her child's care. Patient ambulatory out of ED with mother. "Readings from Last 3 Encounters:   09/25/18 196 lb (88.9 kg)   09/25/18 196 lb (88.9 kg)   09/27/17 205 lb (93 kg)                  Labs reviewed in EPIC    Reviewed and updated as needed this visit by clinical staff  Allergies       Reviewed and updated as needed this visit by Provider         ROS:  Constitutional, HEENT, cardiovascular, pulmonary, gi and gu systems are negative, except as otherwise noted.    OBJECTIVE:     /72  Pulse 95  Temp 98.6  F (37  C) (Tympanic)  Resp 18  Ht 5' 5.25\" (1.657 m)  Wt 196 lb (88.9 kg)  LMP 09/05/2018 (Approximate)  SpO2 99%  BMI 32.37 kg/m2  Body mass index is 32.37 kg/(m^2).  GENERAL: healthy, alert and no distress  NECK: no adenopathy, no asymmetry, masses, or scars and thyroid normal to palpation  RESP: lungs clear to auscultation - no rales, rhonchi or wheezes  CV: regular rate and rhythm, normal S1 S2, no S3 or S4, no murmur, click or rub, no peripheral edema and peripheral pulses strong   (female): normal female external genitalia, normal urethral meatus, vaginal mucosa, normal cervix/adnexa.Pap obtained  Diagnostic Test Results:  none     ASSESSMENT/PLAN:         (Z11.3) Screening for STD (sexually transmitted disease)  (primary encounter diagnosis)  Comment: Patient will be notified of results  Plan: Chlamydia trachomatis PCR, DEPRESSION ACTION         PLAN (DAP)            (Z12.4) Screening for cervical cancer  Comment: Patient will be notified of results  Plan: Pap imaged thin layer screen only - recommended        age 21 - 24 years        FUTURE APPOINTMENTS:       - Follow-up visit as needed    Penny Martinez MD  [facility]  "

## 2022-01-05 ENCOUNTER — PRENATAL OFFICE VISIT (OUTPATIENT)
Dept: OBGYN | Facility: CLINIC | Age: 27
End: 2022-01-05
Payer: COMMERCIAL

## 2022-01-05 ENCOUNTER — APPOINTMENT (OUTPATIENT)
Dept: OBGYN | Facility: CLINIC | Age: 27
End: 2022-01-05
Payer: COMMERCIAL

## 2022-01-05 DIAGNOSIS — Z34.80 PRENATAL CARE, SUBSEQUENT PREGNANCY: ICD-10-CM

## 2022-01-05 PROCEDURE — 99207 PR NO CHARGE NURSE ONLY: CPT | Performed by: OBSTETRICS & GYNECOLOGY

## 2022-01-05 NOTE — PROGRESS NOTES
Lifestyle and nutrition teaching completed   [facility] OB Intake Nurse    Patient supplied answers from flow sheet for:  Prenatal OB Questionnaire.  Past Medical History  Have you ever recieved care for your mental health? : No  Have you ever been in a major accident or suffered serious trauma?: No  Within the last year, has anyone hit, slapped, kicked or otherwise hurt you?: No  In the last year, has anyone forced you to have sex when you didn't want to?: No    Past Medical History 2   Have you ever received a blood transfusion?: No  Would you accept a blood transfusion if was medically recommended?: Yes  Does anyone in your home smoke?: (!) Yes (patient)   Is your blood type Rh negative?: (!) Yes  Have you ever ?: No  Have you been hospitalized for a nonsurgical reason excluding normal delivery?: No  Have you ever had an abnormal pap smear?: No    Past Medical History (Continued)  Do you have a history of abnormalities of the uterus?: No  Did your mother take ORLY or any other hormones when she was pregnant with you?: Unknown  Do you have any other problems we have not asked about which you feel may be important to this pregnancy?: No

## 2022-01-09 ENCOUNTER — HEALTH MAINTENANCE LETTER (OUTPATIENT)
Age: 27
End: 2022-01-09

## 2022-01-25 ENCOUNTER — PRENATAL OFFICE VISIT (OUTPATIENT)
Dept: OBGYN | Facility: CLINIC | Age: 27
End: 2022-01-25
Payer: COMMERCIAL

## 2022-01-25 VITALS
WEIGHT: 185.8 LBS | BODY MASS INDEX: 29.86 KG/M2 | DIASTOLIC BLOOD PRESSURE: 62 MMHG | HEIGHT: 66 IN | SYSTOLIC BLOOD PRESSURE: 120 MMHG | TEMPERATURE: 97.6 F | HEART RATE: 80 BPM | RESPIRATION RATE: 16 BRPM

## 2022-01-25 DIAGNOSIS — Z34.81 PRENATAL CARE, SUBSEQUENT PREGNANCY IN FIRST TRIMESTER: Primary | ICD-10-CM

## 2022-01-25 PROCEDURE — 87491 CHLMYD TRACH DNA AMP PROBE: CPT | Performed by: OBSTETRICS & GYNECOLOGY

## 2022-01-25 PROCEDURE — 87591 N.GONORRHOEAE DNA AMP PROB: CPT | Performed by: OBSTETRICS & GYNECOLOGY

## 2022-01-25 PROCEDURE — 99203 OFFICE O/P NEW LOW 30 MIN: CPT | Mod: 25 | Performed by: OBSTETRICS & GYNECOLOGY

## 2022-01-25 PROCEDURE — 76817 TRANSVAGINAL US OBSTETRIC: CPT | Performed by: OBSTETRICS & GYNECOLOGY

## 2022-01-25 PROCEDURE — G0145 SCR C/V CYTO,THINLAYER,RESCR: HCPCS | Performed by: OBSTETRICS & GYNECOLOGY

## 2022-01-25 ASSESSMENT — MIFFLIN-ST. JEOR: SCORE: 1596.59

## 2022-01-25 NOTE — PROGRESS NOTES
"Nina is a 26 year old  @ 8.6 weeks here for new ob visit.  Planned pregnancy.  LMP was a little shorter than usual.       ROS: Ten point review of systems was reviewed and negative except the above.  Current Issues include: constipation  fatigue    OBhx:  x 1  Gyne: Pap smears Normal  history of STD No STD history  Past Medical History:   Diagnosis Date     Acute maxillary sinusitis 10/17/2011     Infectious mononucleosis 10/17/2011     Lyme disease 2015     Mild major depression 2012    never needed meds     Nose fracture     fx nose     Problems with hearing 10/31/2006    Very sensitive ears  Has PE tubes     Past Surgical History:   Procedure Laterality Date     PE TUBES       TONSILLECTOMY & ADENOIDECTOMY       Patient Active Problem List    Diagnosis Date Noted     Prenatal care, subsequent pregnancy 2022     Priority: Medium     FOB- Tristen Mcraete       Nexplanon 10/28/2016     Priority: Medium     Nexplanon inserted 10/28/2016 by Nimisha Vázquez MD  Lot# [id number]  Exp 2018       Reactive airway disease without complication 2016     Priority: Medium     Tobacco abuse 10/15/2013     Priority: Medium      No Known Allergies  Prenatal Vit-Fe Fumarate-FA (PRENATAL PO),   albuterol (PROAIR HFA/PROVENTIL HFA/VENTOLIN HFA) 108 (90 Base) MCG/ACT inhaler, Inhale 1-2 puffs into the lungs every 4 hours as needed for shortness of breath / dyspnea or wheezing (Patient not taking: Reported on 2022)    No current facility-administered medications on file prior to visit.    FH: Her family history was reviewed and documented in its appropriate chart area.    Past Medical History of Father of Baby:   No significant medical history    Physical Exam: /62 (BP Location: Left arm, Patient Position: Chair, Cuff Size: Adult Regular)   Pulse 80   Temp 97.6  F (36.4  C) (Tympanic)   Resp 16   Ht 1.664 m (5' 5.5\")   Wt 84.3 kg (185 lb 12.8 oz)   LMP 2021   Breastfeeding No   " BMI 30.45 kg/m    General: Well developed, well nourished female  Skin: Normal  HEENT: Normal   Abdomen: Benign and Soft, flat, non-tender   Extremities: Normal  Neurological: Normal   Perineum: Normal   Vulva: Normal  Vagina: Normal mucosa, no discharge  Cervix: Parous, closed, mobile, no discharge     Transvaginal ultrasound was performed.  A viable intrauterine pregnancy was seen.  CRL consistent with 9 weeks, 6 days.  Fetal heart motion was visualized.    EDC by LMP: 2022   EDC by sono:  22  Final EDC: 2022    A/P 26 year old  at  9.6 weeks    1. Discussed physician coverage, tertiary support, diet, exercise, weight gain, schedule of visits, routine and indicated ultrasounds, and childbirth education.  Up to date on vaccinations.     2. Options for  testing for chromosomal and birth defects were discussed with the patient including nuchal lucency/blood marker testing in the first trimester and quad screening and/or Level 2 ultrasound in the second trimester.  We discussed that these are screening tests and not diagnostic tests and that false positives and negatives are a distinct possibility.  We discussed that follow up diagnostic testing would include chorionic villus sampling or amniocentesis depending on gestational age.    Also discussed the option of NIPT, which is less invasive, more accurate, but not always covered by insurance.  They are planning on NIPT    3. Prenatal labs, pap, GC, Chlamydia    4. Prenatal Vitamins      Tea Urbano M.D.

## 2022-01-25 NOTE — NURSING NOTE
"Initial /62 (BP Location: Left arm, Patient Position: Chair, Cuff Size: Adult Regular)   Pulse 80   Temp 97.6  F (36.4  C) (Tympanic)   Resp 16   Ht 1.664 m (5' 5.5\")   Wt 84.3 kg (185 lb 12.8 oz)   LMP 11/24/2021   Breastfeeding No   BMI 30.45 kg/m   Estimated body mass index is 30.45 kg/m  as calculated from the following:    Height as of this encounter: 1.664 m (5' 5.5\").    Weight as of this encounter: 84.3 kg (185 lb 12.8 oz). .    Shamika Hector CMA    "

## 2022-01-26 LAB
C TRACH DNA SPEC QL PROBE+SIG AMP: NEGATIVE
N GONORRHOEA DNA SPEC QL NAA+PROBE: NEGATIVE

## 2022-01-27 LAB
BKR LAB AP GYN ADEQUACY: NORMAL
BKR LAB AP GYN INTERPRETATION: NORMAL
BKR LAB AP HPV REFLEX: NORMAL
BKR LAB AP LMP: NORMAL
BKR LAB AP PREVIOUS ABNORMAL: NORMAL
PATH REPORT.COMMENTS IMP SPEC: NORMAL
PATH REPORT.COMMENTS IMP SPEC: NORMAL
PATH REPORT.RELEVANT HX SPEC: NORMAL

## 2022-03-01 ENCOUNTER — PRENATAL OFFICE VISIT (OUTPATIENT)
Dept: OBGYN | Facility: CLINIC | Age: 27
End: 2022-03-01
Payer: COMMERCIAL

## 2022-03-01 VITALS
RESPIRATION RATE: 12 BRPM | TEMPERATURE: 98.2 F | HEART RATE: 89 BPM | WEIGHT: 188.4 LBS | HEIGHT: 66 IN | BODY MASS INDEX: 30.28 KG/M2 | SYSTOLIC BLOOD PRESSURE: 118 MMHG | DIASTOLIC BLOOD PRESSURE: 71 MMHG

## 2022-03-01 DIAGNOSIS — Z72.0 TOBACCO ABUSE: ICD-10-CM

## 2022-03-01 DIAGNOSIS — Z34.82 PRENATAL CARE, SUBSEQUENT PREGNANCY IN SECOND TRIMESTER: Primary | ICD-10-CM

## 2022-03-01 LAB
ERYTHROCYTE [DISTWIDTH] IN BLOOD BY AUTOMATED COUNT: 12.4 % (ref 10–15)
HCT VFR BLD AUTO: 35.2 % (ref 35–47)
HGB BLD-MCNC: 11.6 G/DL (ref 11.7–15.7)
MCH RBC QN AUTO: 29.6 PG (ref 26.5–33)
MCHC RBC AUTO-ENTMCNC: 33 G/DL (ref 31.5–36.5)
MCV RBC AUTO: 90 FL (ref 78–100)
PLATELET # BLD AUTO: 258 10E3/UL (ref 150–450)
RBC # BLD AUTO: 3.92 10E6/UL (ref 3.8–5.2)
WBC # BLD AUTO: 8.3 10E3/UL (ref 4–11)

## 2022-03-01 PROCEDURE — 99212 OFFICE O/P EST SF 10 MIN: CPT | Performed by: OBSTETRICS & GYNECOLOGY

## 2022-03-01 PROCEDURE — 85027 COMPLETE CBC AUTOMATED: CPT | Performed by: OBSTETRICS & GYNECOLOGY

## 2022-03-01 PROCEDURE — 36415 COLL VENOUS BLD VENIPUNCTURE: CPT | Performed by: OBSTETRICS & GYNECOLOGY

## 2022-03-01 PROCEDURE — 87086 URINE CULTURE/COLONY COUNT: CPT | Performed by: OBSTETRICS & GYNECOLOGY

## 2022-03-01 NOTE — PROGRESS NOTES
"[facility]    Return OB Note    CC: Return OB     Subjective:  Nina is a 26 year old  at 14w6d   Denies vaginal bleeding or loss of fluid. Some continued mild pelvic cramping. No fetal movement yet.  Complaints today: None    Objective:  /71 (BP Location: Left arm, Patient Position: Sitting, Cuff Size: Adult Regular)   Pulse 89   Temp 98.2  F (36.8  C) (Tympanic)   Resp 12   Ht 1.664 m (5' 5.5\")   Wt 85.5 kg (188 lb 6.4 oz)   LMP 2021   BMI 30.87 kg/m      FHT: 150bpm    Assessment/Plan:   Encounter Diagnoses   Name Primary?     Prenatal care, subsequent pregnancy in second trimester Yes     Tobacco abuse        IUP at 14w6d  -Most NOB labs obtained at [facility], reviewed and normal, will obtain CBC today which was not previously collected  -Genetic screening: Would like NIPT, ordered today  -Rh negative: plan Rhogam at 28 weeks  -S/p flu and COVID vaccines  -Anatomy US ordered    RTC 4 weeks    Candi Pulido,     "

## 2022-03-01 NOTE — NURSING NOTE
"Initial /71 (BP Location: Left arm, Patient Position: Sitting, Cuff Size: Adult Regular)   Pulse 89   Temp 98.2  F (36.8  C) (Tympanic)   Resp 12   Ht 1.664 m (5' 5.5\")   Wt 85.5 kg (188 lb 6.4 oz)   LMP 11/24/2021   BMI 30.87 kg/m   Estimated body mass index is 30.87 kg/m  as calculated from the following:    Height as of this encounter: 1.664 m (5' 5.5\").    Weight as of this encounter: 85.5 kg (188 lb 6.4 oz). .      "

## 2022-03-03 LAB — BACTERIA UR CULT: NORMAL

## 2022-03-09 LAB — SCANNED LAB RESULT: NORMAL

## 2022-04-04 ENCOUNTER — PRENATAL OFFICE VISIT (OUTPATIENT)
Dept: OBGYN | Facility: CLINIC | Age: 27
End: 2022-04-04
Payer: COMMERCIAL

## 2022-04-04 VITALS
HEIGHT: 66 IN | BODY MASS INDEX: 30.31 KG/M2 | DIASTOLIC BLOOD PRESSURE: 76 MMHG | TEMPERATURE: 98.3 F | WEIGHT: 188.6 LBS | SYSTOLIC BLOOD PRESSURE: 120 MMHG | RESPIRATION RATE: 16 BRPM | HEART RATE: 84 BPM

## 2022-04-04 DIAGNOSIS — Z71.6 ENCOUNTER FOR TOBACCO USE CESSATION COUNSELING: ICD-10-CM

## 2022-04-04 DIAGNOSIS — Z34.82 PRENATAL CARE, SUBSEQUENT PREGNANCY IN SECOND TRIMESTER: Primary | ICD-10-CM

## 2022-04-04 DIAGNOSIS — Z23 HIGH PRIORITY FOR 2019-NCOV VACCINE: ICD-10-CM

## 2022-04-04 PROCEDURE — 91301 COVID-19,PF,MODERNA (18+ YRS PRIMARY SERIES .5ML): CPT | Performed by: OBSTETRICS & GYNECOLOGY

## 2022-04-04 PROCEDURE — 99212 OFFICE O/P EST SF 10 MIN: CPT | Performed by: OBSTETRICS & GYNECOLOGY

## 2022-04-04 PROCEDURE — 0012A COVID-19,PF,MODERNA (18+ YRS PRIMARY SERIES .5ML): CPT | Performed by: OBSTETRICS & GYNECOLOGY

## 2022-04-04 NOTE — PROGRESS NOTES
"Initial /76 (BP Location: Right arm, Patient Position: Chair, Cuff Size: Adult Regular)   Pulse 84   Temp 98.3  F (36.8  C) (Tympanic)   Resp 16   Ht 1.664 m (5' 5.5\")   Wt 85.5 kg (188 lb 9.6 oz)   LMP 11/24/2021   BMI 30.91 kg/m   Estimated body mass index is 30.91 kg/m  as calculated from the following:    Height as of this encounter: 1.664 m (5' 5.5\").    Weight as of this encounter: 85.5 kg (188 lb 9.6 oz). .      "

## 2022-04-04 NOTE — PROGRESS NOTES
"[facility]     Return OB Note     CC: Return OB      Subjective:  Nina is a 26 year old  at 19w5d   Denies vaginal bleeding or loss of fluid. Some continued mild pelvic cramping. +FM.      Objective:  /76 (BP Location: Right arm, Patient Position: Chair, Cuff Size: Adult Regular)   Pulse 84   Temp 98.3  F (36.8  C) (Tympanic)   Resp 16   Ht 1.664 m (5' 5.5\")   Wt 85.5 kg (188 lb 9.6 oz)   LMP 2021   BMI 30.91 kg/m       Assessment/Plan:    IUP at 19w5d  -Most NOB labs obtained at [facility], reviewed and normal, nl CBC here  -Genetic screening: Normal NIPT - GIRL, Arely   -Rh negative: plan Rhogam at 28 weeks  -S/p flu and COVID vaccines (x1), reviewed risks of COVID, ok with second vaccine today   -Anatomy US scheduled   - tobacco abuse; request nicotine patches      RTC 4 weeks    Margot Patrick MD  OB/GYN     "

## 2022-04-11 ENCOUNTER — HOSPITAL ENCOUNTER (OUTPATIENT)
Dept: ULTRASOUND IMAGING | Facility: CLINIC | Age: 27
Discharge: HOME | End: 2022-04-11
Attending: OBSTETRICS & GYNECOLOGY | Admitting: OBSTETRICS & GYNECOLOGY
Payer: COMMERCIAL

## 2022-04-11 DIAGNOSIS — Z34.82 PRENATAL CARE, SUBSEQUENT PREGNANCY IN SECOND TRIMESTER: ICD-10-CM

## 2022-04-11 PROCEDURE — 76805 OB US >/= 14 WKS SNGL FETUS: CPT

## 2022-04-12 DIAGNOSIS — Z34.92 PRENATAL CARE, SECOND TRIMESTER: Primary | ICD-10-CM

## 2022-05-16 ENCOUNTER — HOSPITAL ENCOUNTER (OUTPATIENT)
Dept: ULTRASOUND IMAGING | Facility: CLINIC | Age: 27
Discharge: HOME | End: 2022-05-16
Attending: OBSTETRICS & GYNECOLOGY | Admitting: OBSTETRICS & GYNECOLOGY
Payer: COMMERCIAL

## 2022-05-16 DIAGNOSIS — Z34.82 PRENATAL CARE, SUBSEQUENT PREGNANCY IN SECOND TRIMESTER: Primary | ICD-10-CM

## 2022-05-16 DIAGNOSIS — Z34.92 PRENATAL CARE, SECOND TRIMESTER: ICD-10-CM

## 2022-05-16 PROCEDURE — 76816 OB US FOLLOW-UP PER FETUS: CPT

## 2022-11-21 ENCOUNTER — HEALTH MAINTENANCE LETTER (OUTPATIENT)
Age: 27
End: 2022-11-21

## 2024-02-04 ENCOUNTER — HEALTH MAINTENANCE LETTER (OUTPATIENT)
Age: 29
End: 2024-02-04

## 2025-03-02 ENCOUNTER — HEALTH MAINTENANCE LETTER (OUTPATIENT)
Age: 30
End: 2025-03-02